# Patient Record
Sex: MALE | Race: WHITE | NOT HISPANIC OR LATINO | Employment: STUDENT | URBAN - METROPOLITAN AREA
[De-identification: names, ages, dates, MRNs, and addresses within clinical notes are randomized per-mention and may not be internally consistent; named-entity substitution may affect disease eponyms.]

---
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
## 2021-01-15 ENCOUNTER — OFFICE VISIT (OUTPATIENT)
Dept: FAMILY MEDICINE | Facility: CLINIC | Age: 21
End: 2021-01-15
Payer: COMMERCIAL

## 2021-01-15 VITALS
HEART RATE: 77 BPM | OXYGEN SATURATION: 97 % | RESPIRATION RATE: 18 BRPM | WEIGHT: 190 LBS | TEMPERATURE: 98.6 F | BODY MASS INDEX: 27.2 KG/M2 | DIASTOLIC BLOOD PRESSURE: 80 MMHG | SYSTOLIC BLOOD PRESSURE: 120 MMHG | HEIGHT: 70 IN

## 2021-01-15 DIAGNOSIS — F33.0 MILD EPISODE OF RECURRENT MAJOR DEPRESSIVE DISORDER (CMS/HCC): ICD-10-CM

## 2021-01-15 PROCEDURE — 99212 OFFICE O/P EST SF 10 MIN: CPT | Performed by: FAMILY MEDICINE

## 2021-01-15 NOTE — ASSESSMENT & PLAN NOTE
Improved.  Discussed patient's improvement that he has had since last visit.  This may be due to decreased stress of school landing.  Patient has since started up semester online again and is feeling that his mood is stable.  Will keep taking medication and discussed that if his symptoms do recur we would increase sertraline to 50 mg daily.  We will follow-up in 6 months but patient told to call office and schedule earlier appointment if symptoms worsening.  Told to obtain blood work at his convenience.

## 2021-01-15 NOTE — PROGRESS NOTES
"      Subjective      Patient ID: Glenroy Antonio is a 20 y.o. male.  2000      Patient reports to office today for follow-up of depression.    Patient was started on sertraline 25 mg daily about 4 weeks ago.  Patient reports his mood has been much better.  Patient started feeling better only few days after taking medication but also reports this is when the semester of school ended as well.  Patient has since started off school again and his mood has been stable.  Denies any side effects to medication.  Complaints.  Patient has not yet obtained blood work written at last visit.      The following have been reviewed and updated as appropriate in this visit:  Allergies  Meds  Problems       Review of Systems   All other systems reviewed and are negative.      Objective     Vitals:    01/15/21 1343   BP: 120/80   BP Location: Right upper arm   Patient Position: Sitting   Pulse: 77   Resp: 18   Temp: 37 °C (98.6 °F)   TempSrc: Oral   SpO2: 97%   Weight: 86.2 kg (190 lb)   Height: 1.778 m (5' 10\")     Body mass index is 27.26 kg/m².    Physical Exam  Vitals signs and nursing note reviewed.   Constitutional:       Appearance: He is well-developed.   Neck:      Musculoskeletal: Normal range of motion.   Cardiovascular:      Rate and Rhythm: Normal rate and regular rhythm.   Pulmonary:      Effort: Pulmonary effort is normal.   Skin:     General: Skin is warm and dry.   Neurological:      Mental Status: He is alert.   Psychiatric:         Mood and Affect: Mood normal.         Behavior: Behavior normal.         Assessment/Plan   Diagnoses and all orders for this visit:    Mild episode of recurrent major depressive disorder (CMS/HCC)  Assessment & Plan:  Improved.  Discussed patient's improvement that he has had since last visit.  This may be due to decreased stress of school landing.  Patient has since started up semester online again and is feeling that his mood is stable.  Will keep taking medication and discussed " that if his symptoms do recur we would increase sertraline to 50 mg daily.  We will follow-up in 6 months but patient told to call office and schedule earlier appointment if symptoms worsening.  Told to obtain blood work at his convenience.      I spent 15 minutes on this date of service performing the following activities: obtaining history, documenting and providing counseling and education.

## 2021-01-29 LAB
25(OH)D3+25(OH)D2 SERPL-MCNC: 21.5 NG/ML (ref 30–100)
ALBUMIN SERPL-MCNC: 4.7 G/DL (ref 3.9–5)
ALBUMIN/GLOB SERPL: 2.1 {RATIO} (ref 1.2–2.2)
ALP SERPL-CCNC: 113 IU/L (ref 39–117)
ALT SERPL-CCNC: 138 IU/L (ref 0–32)
AST SERPL-CCNC: 59 IU/L (ref 0–40)
BASOPHILS # BLD AUTO: 0.1 X10E3/UL (ref 0–0.2)
BASOPHILS NFR BLD AUTO: 1 %
BILIRUB SERPL-MCNC: 0.6 MG/DL (ref 0–1.2)
BUN SERPL-MCNC: 14 MG/DL (ref 6–20)
BUN/CREAT SERPL: 18 (ref 9–23)
CALCIUM SERPL-MCNC: 9.7 MG/DL (ref 8.7–10.2)
CHLORIDE SERPL-SCNC: 103 MMOL/L (ref 96–106)
CO2 SERPL-SCNC: 26 MMOL/L (ref 20–29)
CREAT SERPL-MCNC: 0.77 MG/DL (ref 0.57–1)
EOSINOPHIL # BLD AUTO: 0.2 X10E3/UL (ref 0–0.4)
EOSINOPHIL NFR BLD AUTO: 2 %
ERYTHROCYTE [DISTWIDTH] IN BLOOD BY AUTOMATED COUNT: 12.4 % (ref 11.7–15.4)
GLOBULIN SER CALC-MCNC: 2.2 G/DL (ref 1.5–4.5)
GLUCOSE SERPL-MCNC: 88 MG/DL (ref 65–99)
HCT VFR BLD AUTO: 46.5 % (ref 34–46.6)
HGB BLD-MCNC: 15.8 G/DL (ref 11.1–15.9)
IMM GRANULOCYTES # BLD AUTO: 0 X10E3/UL (ref 0–0.1)
IMM GRANULOCYTES NFR BLD AUTO: 0 %
LAB CORP EGFR IF AFRICN AM: 129 ML/MIN/1.73
LAB CORP EGFR IF NONAFRICN AM: 112 ML/MIN/1.73
LYMPHOCYTES # BLD AUTO: 2.9 X10E3/UL (ref 0.7–3.1)
LYMPHOCYTES NFR BLD AUTO: 40 %
MCH RBC QN AUTO: 29.3 PG (ref 26.6–33)
MCHC RBC AUTO-ENTMCNC: 34 G/DL (ref 31.5–35.7)
MCV RBC AUTO: 86 FL (ref 79–97)
MONOCYTES # BLD AUTO: 0.5 X10E3/UL (ref 0.1–0.9)
MONOCYTES NFR BLD AUTO: 7 %
NEUTROPHILS # BLD AUTO: 3.6 X10E3/UL (ref 1.4–7)
NEUTROPHILS NFR BLD AUTO: 50 %
PLATELET # BLD AUTO: 330 X10E3/UL (ref 150–450)
POTASSIUM SERPL-SCNC: 4.6 MMOL/L (ref 3.5–5.2)
PROT SERPL-MCNC: 6.9 G/DL (ref 6–8.5)
RBC # BLD AUTO: 5.39 X10E6/UL (ref 3.77–5.28)
SODIUM SERPL-SCNC: 143 MMOL/L (ref 134–144)
TSH SERPL DL<=0.005 MIU/L-ACNC: 1.21 UIU/ML (ref 0.45–4.5)
WBC # BLD AUTO: 7.2 X10E3/UL (ref 3.4–10.8)

## 2021-02-04 DIAGNOSIS — R74.01 TRANSAMINITIS: Primary | ICD-10-CM

## 2021-02-05 ENCOUNTER — TELEPHONE (OUTPATIENT)
Dept: FAMILY MEDICINE | Facility: CLINIC | Age: 21
End: 2021-02-05

## 2021-02-05 DIAGNOSIS — Z00.00 ROUTINE HEALTH MAINTENANCE: Primary | ICD-10-CM

## 2021-02-05 NOTE — TELEPHONE ENCOUNTER
Patient's mother would like to discuss her son's recent test results.  She is on his [facility] - [phone]

## 2021-02-05 NOTE — TELEPHONE ENCOUNTER
Is there anything other than the repeat labs that you want me to let her know? Or would you want to s/w mom.

## 2021-02-22 NOTE — PROGRESS NOTES
[facility]  [address] [place] [address]  [phone]       Verification of Patient Location:  The patient affirms they are currently located in the following state: [place]    Request for Consent:   Video Encounter   Hello, my name is Sharifa AYALA Sim.  Before we proceed, can you please verify your identification by telling me your full name and date of birth?  Can you tell me who is in the room with you?    You and I are about to have a telemedicine check-in or visit because you have requested it.  This is a live video-conference.  I am a real person, speaking to you in real time.  There is no one else with me on the video-conference.  However, when we use ([employer], [employer], etc) it is important for you to know that the video-conference may not be secure or private.  I am not recording this conversation and I am asking you not to record it.  This telemedicine visit will be billed to your health insurance or you, if you are self-insured.  You understand you will be responsible for any copayments or coinsurances that apply to your telemedicine visit.  Communication platform used for this encounter:  [employer]     Before starting our telemedicine visit, I am required to get your consent for this virtual check-in or visit by telemedicine. Do you consent?            Patient Response to Request for Consent:  Yes        Reason for visit:   Chief Complaint   Patient presents with   • Depression      HPI   Glenroy Antonio is a 20 y.o. male who presents with worsening depression     Depression  Episode onset: 1.5-2 weeks ago  Associated symptoms include fatigue. Pertinent negatives include no chest pain, chills, coughing, fever, nausea, sore throat or vomiting. Associated symptoms comments: Denies loss of taste or smell   Lack of motivation and focusing on school work and doing daily task .           Medical History:  No past medical history on file.    Surgical History:  No  past surgical history on file.    Social History:  Social History     Social History Narrative   • Not on file       Family History:  Family History   Problem Relation Age of Onset   • Anxiety disorder Biological Mother        Allergies:  Patient has no known allergies.    Current Medications:  Current Outpatient Medications   Medication Sig Dispense Refill   • cholecalciferol, vitamin D3, (VITAMIN D3) 10 mcg (400 unit) capsule Take by mouth.     • sertraline (ZOLOFT) 50 mg tablet Take 1 tablet (50 mg total) by mouth daily. 90 tablet 0     No current facility-administered medications for this visit.        Review of Systems:  Review of Systems   Constitutional: Positive for fatigue. Negative for appetite change, chills and fever.   HENT: Negative.  Negative for sore throat.    Eyes: Negative.    Respiratory: Negative for cough and shortness of breath.    Cardiovascular: Negative for chest pain.   Gastrointestinal: Negative for constipation, diarrhea, nausea and vomiting.   Endocrine: Negative.    Genitourinary: Negative.    Musculoskeletal: Negative.    Skin: Negative.    Allergic/Immunologic: Negative.    Neurological: Negative for dizziness, facial asymmetry and light-headedness.   Hematological: Negative.    Psychiatric/Behavioral: Negative.  Negative for suicidal ideas.       Objective     Vital Signs:  There were no vitals filed for this visit.    BMI:  There is no height or weight on file to calculate BMI.     Physical Exam    Recent labs before today:     Lab Results   Component Value Date    WBC 7.2 01/28/2021    HGB 15.8 01/28/2021    HCT 46.5 01/28/2021     01/28/2021     (H) 01/28/2021    AST 59 (H) 01/28/2021     01/28/2021    K 4.6 01/28/2021     01/28/2021    CREATININE 0.77 01/28/2021    BUN 14 01/28/2021    CO2 26 01/28/2021    TSH 1.210 01/28/2021        Assessment/Plan   Problem List Items Addressed This Visit        Other    Mild episode of recurrent major depressive  disorder (CMS/HCC) - Primary    Relevant Medications    sertraline (ZOLOFT) 50 mg tablet      Other Visit Diagnoses     Elevated liver enzymes        Vegetarian        Relevant Orders    VITAMIN B12/FOLATE, SERUM PANEL        #Depression  Current regimen  Per noted first diagnosed  and start on Zoloft 25 mg daily  In 12/4/2020. Then there was no follow noted until 1/15/2021 which notes he start Zoloft 25 mg daily 4 weeks before that appointment. Per that note patient was stable on 25mg daily and to call if symptoms worsen      Current PHQ-9 score   PHQ 2 to 9:  Will the patient answer the depression questions?: Y    Over the past 2 weeks, how often have you been bothered by feeling little interest or pleasure in doing things?: Almost all    Over the past 2 weeks, how often have you been bothered by feeling down, depressed, or hopeless?: Almost all    Depression Risk: 6    Over the past 2 weeks, how often have you been bothered by trouble falling or staying asleep, or sleeping too much?: Over half (sleeping too much)    Over the past 2 weeks, how often have you been bothered by feeling tired or having little energy?: Over half    Over the past 2 weeks, how often have you been bothered by a poor appetite or overeating?: Several days (over eating)    Over the past 2 weeks, how often have you been bothered by feeling bad about yourself - or that you are a failure or have let yourself or your family down?: Several days    Over the past 2 weeks, how often have you been bothered by trouble concentrating on things, such as reading the newspaper or watching television?: Almost all    Over the past 2 weeks, how often have you been bothered by moving or speaking so slowly that other people could have noticed? Or the opposite - being so fidgety or restless that you have been moving around a lot more than usual?: Not at all    Over the past 2 weeks, how often have you been bothered by thoughts that you would be better off dead  or of hurting yourself in some way?: Not at all    Depression Risk Score: 15    If You Checked Off Any Problems, How Difficult Have These Problems Made It For You to Do Your Work, Take Care of Things at Home, or Get Along with Other People?: very difficult    PHQ interpretation: PHQ result indicates moderately severe depression          Last PHQ-9 score none on record   Therapy: currently not in one   Plan  Increase Zoloft 50mg daily and advise patient to get into therapy. Will send references once patient becomes active on portal. Also going to send message to social work if there are any other resources for patient on therapy when he comes off insurance        #liver enzymes   Lab Results   Component Value Date    AST 59 (H) 01/28/2021     (H) 01/28/2021   patient was noted with doctor Judith to have elevated liver enzymes message were sent for patient to get repeat blood work such as hepatitis. Explained all this to both patient and mom on phone. Mom admits that they will be coming off her insurance due to it ending soon. Patient can be on dad's plan isn't very good. Therefore that patient can get repeat blood work done sooner in the next weeks before coming off moms insurance   Also mom states that son is living with friend who is a vegetarian and that the patient is really not eating a lot of meat but also not eating bean either, there for there is a lack of protein. Explained to mom that we need to workup the hepatitis because it is the gold standard. Also had B12/folate which might be decreased Procedures     Due to the national medical emergency, this visit was conducted via telephone/ video.     Because of the COVID-19 pandemic, in order to limit patient contact and promote the safety of patients and the healthcare team, I did not physically examine the patient.   There are no Patient Instructions on file for this visit.           Time Spent in Medical Discussion During This Encounter:  26  minutes    AYALA West  2/23/2021

## 2021-02-23 ENCOUNTER — TELEMEDICINE (OUTPATIENT)
Dept: FAMILY MEDICINE | Facility: CLINIC | Age: 21
End: 2021-02-23
Payer: COMMERCIAL

## 2021-02-23 DIAGNOSIS — F33.0 MILD EPISODE OF RECURRENT MAJOR DEPRESSIVE DISORDER (CMS/HCC): Primary | ICD-10-CM

## 2021-02-23 DIAGNOSIS — R74.8 ELEVATED LIVER ENZYMES: ICD-10-CM

## 2021-02-23 DIAGNOSIS — Z78.9 VEGETARIAN: ICD-10-CM

## 2021-02-23 PROCEDURE — 99213 OFFICE O/P EST LOW 20 MIN: CPT | Mod: 95 | Performed by: NURSE PRACTITIONER

## 2021-02-23 RX ORDER — SERTRALINE HYDROCHLORIDE 50 MG/1
50 TABLET, FILM COATED ORAL DAILY
Qty: 90 TABLET | Refills: 0 | Status: SHIPPED | OUTPATIENT
Start: 2021-02-23 | End: 2021-03-22 | Stop reason: ALTCHOICE

## 2021-02-23 ASSESSMENT — ENCOUNTER SYMPTOMS
LIGHT-HEADEDNESS: 0
HEMATOLOGIC/LYMPHATIC NEGATIVE: 1
MUSCULOSKELETAL NEGATIVE: 1
DIZZINESS: 0
APPETITE CHANGE: 0
CONSTIPATION: 0
CHILLS: 0
FACIAL ASYMMETRY: 0
EYES NEGATIVE: 1
PSYCHIATRIC NEGATIVE: 1
SHORTNESS OF BREATH: 0
VOMITING: 0
ENDOCRINE NEGATIVE: 1
FATIGUE: 1
NAUSEA: 0
DIARRHEA: 0
SORE THROAT: 0
ALLERGIC/IMMUNOLOGIC NEGATIVE: 1
COUGH: 0
FEVER: 0

## 2021-02-23 ASSESSMENT — PATIENT HEALTH QUESTIONNAIRE - PHQ9
SUM OF ALL RESPONSES TO PHQ9 QUESTIONS 1 & 2: 6
SUM OF ALL RESPONSES TO PHQ QUESTIONS 1-9: 15

## 2021-02-24 ENCOUNTER — PATIENT OUTREACH (OUTPATIENT)
Dept: CASE MANAGEMENT | Facility: CLINIC | Age: 21
End: 2021-02-24

## 2021-02-24 NOTE — PROGRESS NOTES
Social Work Note:     Agency contact:    Social Work referral received by PCP to assist pt. to connect to counseling.    Situation/background:    Pt. is 20 years old and lives with a friend who is a vegetarian. Pt. is a student and is having difficulties focusing and staying motivated with school work and daily tasks. Pt. recently started on Zoloft and the dosage was just increased. Pt. is currently on his mom's insurance, however, he will be come off the policy. His Dad's insurance does not have good coverage. Pt. has a [employer] card scanned in EMR and they offer mental counseling if the policy is active.    Intervention/Follow up;     called pt. to discuss options. Left vm msg requesting a call back at his convenience.   Pt. could possibly have on-line counseling through the [facility] [phone] with a Masters level intern for $15-45 an hour.  Pt. can also go to the [facility] in [place] which has two female therapists. Sliding scale fees based on $125 for 50 min. [phone].   called [facility] in [place] [phone] and they take pts. without insurance. First assessment is $88, follow up counseling appts. range from $33-90 based on length of session from 30-60 min, an initial psych evaluation is $133 and follow up medication management is $45.  Pt. has 's contact number for any further questions or concerns.   to follow up next week.    Elaine Dee, JIM  [phone]

## 2021-03-02 ENCOUNTER — TELEPHONE (OUTPATIENT)
Dept: FAMILY MEDICINE | Facility: CLINIC | Age: 21
End: 2021-03-02

## 2021-03-02 DIAGNOSIS — R74.01 ELEVATED AST (SGOT): Primary | ICD-10-CM

## 2021-03-02 DIAGNOSIS — R74.01 ELEVATED ALT MEASUREMENT: ICD-10-CM

## 2021-03-02 LAB
ALBUMIN SERPL-MCNC: 4.9 G/DL (ref 4.1–5.2)
ALBUMIN/GLOB SERPL: 2.2 {RATIO} (ref 1.2–2.2)
ALP SERPL-CCNC: 110 IU/L (ref 39–117)
ALT SERPL-CCNC: 133 IU/L (ref 0–44)
AST SERPL-CCNC: 46 IU/L (ref 0–40)
BASOPHILS # BLD AUTO: 0.1 X10E3/UL (ref 0–0.2)
BASOPHILS NFR BLD AUTO: 1 %
BILIRUB SERPL-MCNC: 0.6 MG/DL (ref 0–1.2)
BUN SERPL-MCNC: 19 MG/DL (ref 6–20)
BUN/CREAT SERPL: 25 (ref 9–20)
CALCIUM SERPL-MCNC: 9.8 MG/DL (ref 8.7–10.2)
CHLORIDE SERPL-SCNC: 104 MMOL/L (ref 96–106)
CHOLEST SERPL-MCNC: 233 MG/DL (ref 100–169)
CO2 SERPL-SCNC: 24 MMOL/L (ref 20–29)
CREAT SERPL-MCNC: 0.76 MG/DL (ref 0.76–1.27)
EOSINOPHIL # BLD AUTO: 0.1 X10E3/UL (ref 0–0.4)
EOSINOPHIL NFR BLD AUTO: 1 %
ERYTHROCYTE [DISTWIDTH] IN BLOOD BY AUTOMATED COUNT: 12.9 % (ref 11.6–15.4)
FERRITIN SERPL-MCNC: 178 NG/ML (ref 16–124)
FOLATE SERPL-MCNC: 16.3 NG/ML
GLOBULIN SER CALC-MCNC: 2.2 G/DL (ref 1.5–4.5)
GLUCOSE SERPL-MCNC: 86 MG/DL (ref 65–99)
HAV IGM SERPL QL IA: NEGATIVE
HBV CORE IGM SERPL QL IA: NEGATIVE
HBV SURFACE AG SERPL QL IA: NEGATIVE
HCT VFR BLD AUTO: 46.6 % (ref 37.5–51)
HCV AB S/CO SERPL IA: <0.1 S/CO RATIO (ref 0–0.9)
HDLC SERPL-MCNC: 40 MG/DL
HGB BLD-MCNC: 15.7 G/DL (ref 13–17.7)
IMM GRANULOCYTES # BLD AUTO: 0 X10E3/UL (ref 0–0.1)
IMM GRANULOCYTES NFR BLD AUTO: 0 %
IRON SATN MFR SERPL: 26 % (ref 15–55)
IRON SERPL-MCNC: 102 UG/DL (ref 38–169)
LAB CORP EGFR IF AFRICN AM: 153 ML/MIN/1.73
LAB CORP EGFR IF NONAFRICN AM: 132 ML/MIN/1.73
LDLC SERPL CALC-MCNC: 165 MG/DL (ref 0–109)
LYMPHOCYTES # BLD AUTO: 2.9 X10E3/UL (ref 0.7–3.1)
LYMPHOCYTES NFR BLD AUTO: 37 %
MCH RBC QN AUTO: 29.1 PG (ref 26.6–33)
MCHC RBC AUTO-ENTMCNC: 33.7 G/DL (ref 31.5–35.7)
MCV RBC AUTO: 87 FL (ref 79–97)
MONOCYTES # BLD AUTO: 0.5 X10E3/UL (ref 0.1–0.9)
MONOCYTES NFR BLD AUTO: 7 %
NEUTROPHILS # BLD AUTO: 4.2 X10E3/UL (ref 1.4–7)
NEUTROPHILS NFR BLD AUTO: 54 %
PLATELET # BLD AUTO: 300 X10E3/UL (ref 150–450)
POTASSIUM SERPL-SCNC: 4.5 MMOL/L (ref 3.5–5.2)
PROT SERPL-MCNC: 7.1 G/DL (ref 6–8.5)
RBC # BLD AUTO: 5.39 X10E6/UL (ref 4.14–5.8)
SODIUM SERPL-SCNC: 144 MMOL/L (ref 134–144)
TIBC SERPL-MCNC: 399 UG/DL (ref 250–450)
TRIGL SERPL-MCNC: 155 MG/DL (ref 0–89)
UIBC SERPL-MCNC: 297 UG/DL (ref 111–343)
VIT B12 SERPL-MCNC: 635 PG/ML (ref 232–1245)
VLDLC SERPL CALC-MCNC: 28 MG/DL (ref 5–40)
WBC # BLD AUTO: 7.8 X10E3/UL (ref 3.4–10.8)

## 2021-03-02 NOTE — RESULT ENCOUNTER NOTE
Please let patient know that CBC, hepatitis panel and iron serum and saturation all within normal range. As well as Folate and B 12 normal     His serum ferritin is elevated slightly at 178. Which is mostly related to elevated liver enzymes causing inflammation.     His liver enzymes are still elevated but have gone down from last time. AST went from 59 to 46 and ALT went from 138 to 133. I am placing an order for US of liver to get done and can we also get him into [facility] for further workup as well.  I sent referral to [facility] Alexandra HILL.  Also please give Central scheduling [phone]    Total cholesterol  233 we like that under 199, triglycerides are 155 we like below 89.  HDL is 40 we like it over 43. LDL is 165 and we like that under 100.      for cholesterol educate below  Following one if the dietary patterns below of   Mediterranean diet  Diet high in fruits, veggies, whole grains,bean, nutes and seeds  Use of olive oil due to it being a monounsaturated fat and decrease saturated fats such as from butter   Limited alcohol consumption   Low to moderate amounts fish, poultry and dairy products with little red meat  Decrease carb intake  Decrease fat total intake as well as saturated fat  Want to reduce fat intake by 25-30% of daily calories  Ex: 1500 criselda diet would eat 45g or fewer of fat per day  Choose lower content option such as in  skim or low fat milk, low fat cheese, and  fat free yogurt   Increase physical activity     thanks

## 2021-03-02 NOTE — TELEPHONE ENCOUNTER
----- Message from AYALA Colon sent at 3/2/2021 12:25 PM EST -----  Please let patient know that CBC, hepatitis panel and iron serum and saturation all within normal range. As well as Folate and B 12 normal     His serum ferritin is elevated slightly at 178. Which is mostly related to elevated liver enzymes causing inflammation.     His liver enzymes are still elevated but have gone down from last time. AST went from 59 to 46 and ALT went from 138 to 133. I am placing an order for US of liver to get done and can we also get him into [facility] for further workup as well.  I sent referral to [facility] Alexandra HILL.  Also please give Central scheduling [phone]    Total cholesterol  233 we like that under 199, triglycerides are 155 we like below 89.  HDL is 40 we like it over 43. LDL is 165 and we like that under 100.       for cholesterol educate below  Following one if the dietary patterns below of   Mediterranean diet  Diet high in fruits, veggies, whole grains,bean, nutes and seeds  Use of olive oil due to it being a monounsaturated fat and decrease saturated fats such as from butter   Limited alcohol consumption   Low to moderate amounts fish, poultry and dairy products with little red meat  Decrease carb intake  Decrease fat total intake as well as saturated fat  Want to reduce fat intake by 25-30% of daily calories  Ex: 1500 criselda diet would eat 45g or fewer of fat per day  Choose lower content option such as in  skim or low fat milk, low fat cheese, and  fat free yogurt   Increase physical activity     thanks

## 2021-03-03 ENCOUNTER — PATIENT OUTREACH (OUTPATIENT)
Dept: CASE MANAGEMENT | Facility: CLINIC | Age: 21
End: 2021-03-03

## 2021-03-03 NOTE — PROGRESS NOTES
Social Work follow up note:     called pt. to provide counseling options.  Left 2nd vm msg requesting a call back at his convenience.   to follow up in 1-2 weeks.    Elaine Dee, JIM  [phone]

## 2021-03-15 ENCOUNTER — HOSPITAL ENCOUNTER (OUTPATIENT)
Dept: RADIOLOGY | Facility: HOSPITAL | Age: 21
Discharge: HOME | End: 2021-03-15
Attending: NURSE PRACTITIONER
Payer: COMMERCIAL

## 2021-03-15 ENCOUNTER — TELEPHONE (OUTPATIENT)
Dept: FAMILY MEDICINE | Facility: CLINIC | Age: 21
End: 2021-03-15

## 2021-03-15 DIAGNOSIS — R74.01 ELEVATED ALT MEASUREMENT: ICD-10-CM

## 2021-03-15 DIAGNOSIS — R74.01 ELEVATED AST (SGOT): ICD-10-CM

## 2021-03-15 PROCEDURE — 76705 ECHO EXAM OF ABDOMEN: CPT

## 2021-03-15 NOTE — TELEPHONE ENCOUNTER
Left message for patient to call office tomorrow to discuss result of the Liver US. Will try again tomorrow to call patient if I don't hear from him

## 2021-03-16 ENCOUNTER — TELEPHONE (OUTPATIENT)
Dept: FAMILY MEDICINE | Facility: CLINIC | Age: 21
End: 2021-03-16

## 2021-03-16 NOTE — TELEPHONE ENCOUNTER
Left message for patient regarding US of liver result and to call back if questions but gave patient [facility] [place] [facility] [place] [phone] to follow up with.

## 2021-03-18 NOTE — TELEPHONE ENCOUNTER
Patients mother, Akila, called to review patients results.  She is on [facility]. She can be reached at [phone]

## 2021-03-19 ENCOUNTER — PATIENT OUTREACH (OUTPATIENT)
Dept: CASE MANAGEMENT | Facility: CLINIC | Age: 21
End: 2021-03-19

## 2021-03-19 ENCOUNTER — TELEPHONE (OUTPATIENT)
Dept: FAMILY MEDICINE | Facility: CLINIC | Age: 21
End: 2021-03-19

## 2021-03-19 NOTE — TELEPHONE ENCOUNTER
Psychiatrists can charge as much as $400-$500 an hour and many do not accept insurance so [facility] was a reasonable option.

## 2021-03-19 NOTE — PROGRESS NOTES
Social Work follow up note:    AYALA requested that  call pt.'s mom with counseling and psychiatry information since pt. does not answer his phone.   called pt.'s mom, Akila, as requested and left a  msg requesting a call back at her convenience.   to follow up next week.    JIM Buenrostro  [phone]

## 2021-03-19 NOTE — TELEPHONE ENCOUNTER
Liver related mortality from NAFLD is approximately 10% and most patients have increased risk of mortality due to cardiovascular disease. Therefore, managing CV disease risk is a central part of managing NAFLD. We discussed that there are no currently approved drug treatments specifically for fatty liver disease, however there are certain lifestyle modifications that can be made at this time to reduce the primary complications of NAFLD. These complications include cardiovascular events, malignancy, and progressive liver disease.      1. We discussed the importance of weight loss. A 5-10% weight loss would greatly decrease the risks of complications from fatty liver disease.      2. We discussed the importance of diet modification.      3. Moderate exercise 3-4 times a week for at least 30 minutes at a time.     4. I recommended drinking caffeinated black coffee as there is a potential benefit for patients with IGLESIAS  Monitor fatty liver with US annually would probably recommend        spoke with patient PCP regarding this plan and we would like him to see GI.   Some GI resources given.   [facility] [place] [facility] [place]  [phone]  [facility], [place]  [phone]    Patient mothers also mention that patient doesn't feel that the Zoloft increase and wants made switch to celexa. Patient has not look into any of the references social work had mention. Told mom that patient needs to reach out to me or Dr. robbins for follow up on this. Also mention to mom that maybe see a psychiatrists might not be a bad a idea as well. Reach out to micah marin is she can look into some psychiatrist as well and to call mom with them since mom says patient doesn't like to answer his phone     Told mom that patient needs to be taking a more active role in this process as well      Mom verbalize understanding and question answered

## 2021-03-19 NOTE — TELEPHONE ENCOUNTER
Okay wanted to make sure their weren't any other resources besides that thanks   Have a great weekend

## 2021-03-22 ENCOUNTER — PATIENT OUTREACH (OUTPATIENT)
Dept: CASE MANAGEMENT | Facility: CLINIC | Age: 21
End: 2021-03-22

## 2021-03-22 ENCOUNTER — TELEMEDICINE (OUTPATIENT)
Dept: FAMILY MEDICINE | Facility: CLINIC | Age: 21
End: 2021-03-22
Payer: COMMERCIAL

## 2021-03-22 DIAGNOSIS — R79.89 ELEVATED LFTS: ICD-10-CM

## 2021-03-22 DIAGNOSIS — F33.1 MODERATE EPISODE OF RECURRENT MAJOR DEPRESSIVE DISORDER (CMS/HCC): Primary | ICD-10-CM

## 2021-03-22 PROCEDURE — 99214 OFFICE O/P EST MOD 30 MIN: CPT | Mod: 95 | Performed by: FAMILY MEDICINE

## 2021-03-22 RX ORDER — BUPROPION HYDROCHLORIDE 150 MG/1
TABLET, EXTENDED RELEASE ORAL
Qty: 180 TABLET | Refills: 0 | Status: SHIPPED | OUTPATIENT
Start: 2021-03-22 | End: 2021-11-15 | Stop reason: ALTCHOICE

## 2021-03-22 NOTE — TELEPHONE ENCOUNTER
For this patient can you call the mom and let her know that there are no other resources for psychiatrists without insurance and please give theses references and that I also spoke with Dr. Wong and he recommend seeing a psychiatrists as well   Pt. could possibly have on-line counseling through the [facility] [phone] with a Masters level intern for $15-45 an hour.  Pt. can also go to the [facility] in [place] which has two female therapists. Sliding scale fees based on $125 for 50 min. [phone].   called [facility] in [place] [phone] and they take pts. without insurance. First assessment is $88, follow up counseling appts. range from $33-90 based on length of session from 30-60 min, an initial psych evaluation is $133 and follow up medication management is $45.    thanks

## 2021-03-22 NOTE — PROGRESS NOTES
"Verification of Patient Location:  The patient affirms they are currently located in the following state: [place]    Request for Consent:    Video Encounter   Hello, my name is Aston Wong MD.  Before we proceed, can you please verify your identification by telling me your full name and date of birth?  Can you tell me who is in the room with you?    You and I are about to have a telemedicine check-in or visit because you have requested it.  This is a live video-conference.  I am a real person, speaking to you in real time.  There is no one else with me on the video-conference.  However, when we use ([employer], [employer], etc) it is important for you to know that the video-conference may not be secure or private.  I am not recording this conversation and I am asking you not to record it.  This telemedicine visit will be billed to your health insurance or you, if you are self-insured.  You understand you will be responsible for any copayments or coinsurances that apply to your telemedicine visit.  Communication platform used for this encounter:  Doximity     Before starting our telemedicine visit, I am required to get your consent for this virtual check-in or visit by telemedicine. Do you consent?      Patient Response to Request for Consent:  Yes      Visit Documentation:  Subjective     Patient ID: Glenroy Antonio is a 20 y.o. male.  2000      Patient reports for video telemedicine visit follow-up for depression.    Patient has been on sertraline for several months that was increased to 50 mg daily over 4 weeks ago.  Patient denies feeling any improvement on medication.  Patient denies any acute changes in mood but has felt that his depressed mood has slightly worsened.  Patient has had some thoughts of \"not wanting to be alive\" but denies any plan to do so.  No HI.  Patient denies any guns in the home.  Patient has been attempting to get an appointment with psychologist for cognitive behavioral therapy " and has worked with our .    Patient is also on recent work-up for mildly elevated LFTs including ultrasound indicative of fatty infiltration of liver.      The following have been reviewed and updated as appropriate in this visit:  Tobacco  Allergies  Meds  Problems  Med Hx  Surg Hx  Fam Hx       Review of Systems   All other systems reviewed and are negative.        Assessment/Plan   Diagnoses and all orders for this visit:    Moderate episode of recurrent major depressive disorder (CMS/HCC) (Primary)  Assessment & Plan:  Discussed tapering off of Zoloft at this time as it is not giving her much improvement.  Discussed switching to Wellbutrin at this time.  Patient does have family members and her on this medication seem to have benefit from it.  Discussed calling [facility] for further resources to initiate cognitive behavioral therapy.  Patient also given number for mobile crisis if he should have any worsening mood or suicidal ideation.    Discussed tapering Zoloft back down to 25 mg for 2 weeks and then discontinuing and starting Wellbutrin 150 mg daily x1 week and then twice daily.  We will follow-up patient in 4 to 6 weeks or sooner if needed.      Elevated LFTs  Assessment & Plan:  Discussed referral that Sharifa Paredes PA-C had placed for patient.  Discussed that he can follow-up with gastroenterology when he gets his mood symptoms under control.  Told we should be following up on liver function testing in about 3 months if he has not yet seen GI.  Discussed low-fat diet as he symptoms may be due to fatty liver disease.      Other orders  -     buPROPion SR (WELLBUTRIN SR) 150 mg 12 hr tablet; 1 tab PO daily x 1 week, then 1 tab PO twice daily      Time Spent:  I spent 21 minutes on this date of service performing the following activities: obtaining history, documenting, obtaining / reviewing records and providing counseling and education.

## 2021-03-22 NOTE — ASSESSMENT & PLAN NOTE
Discussed referral that Sharifa Paredes PA-C had placed for patient.  Discussed that he can follow-up with gastroenterology when he gets his mood symptoms under control.  Told we should be following up on liver function testing in about 3 months if he has not yet seen GI.  Discussed low-fat diet as he symptoms may be due to fatty liver disease.

## 2021-03-22 NOTE — ASSESSMENT & PLAN NOTE
Discussed tapering off of Zoloft at this time as it is not giving her much improvement.  Discussed switching to Wellbutrin at this time.  Patient does have family members and her on this medication seem to have benefit from it.  Discussed calling [facility] for further resources to initiate cognitive behavioral therapy.  Patient also given number for mobile crisis if he should have any worsening mood or suicidal ideation.    Discussed tapering Zoloft back down to 25 mg for 2 weeks and then discontinuing and starting Wellbutrin 150 mg daily x1 week and then twice daily.  We will follow-up patient in 4 to 6 weeks or sooner if needed.

## 2021-03-22 NOTE — PROGRESS NOTES
Social Work follow up note:    Pt.'s mom, Akila returned call to request referrals for outpatient counsling and psychiatry for her 20 year old son who is struggling with depression. Pt. is doing online school from [employer] and is struggling to concentrate and stay motivated; he has always been a highly motivated student.. He plans to take a semester off. Pt.'s mom stated that her son was up all night crying and will have a Telemed appt. with PCP today to discuss his medications.  Pt. has a 21 year old sister who thrived with on line education and will graduate from [employer].  Pt. currently has [employer] as his insurance and his mom plans to see if it can be extended.   provided referrals for [facility] in [place]: [phone] which accepts insurance and private pay.   The [facility]: [phone] which has a sliding scale fee as well as Masters level interns that charge between $15-$5 per session. They are Telehealth due to covid-19 pandemic.  Pt.'s mom stated that pt  would prefer not to talk to a Taoist counselor at the [facility] in [facility].   to follow up next week.    Elaine Dee, JIM  [phone]

## 2021-03-31 ENCOUNTER — PATIENT OUTREACH (OUTPATIENT)
Dept: CASE MANAGEMENT | Facility: CLINIC | Age: 21
End: 2021-03-31

## 2021-03-31 NOTE — PROGRESS NOTES
Social Work follow up note:     called pt.'s Mom, Akila to see if she had connected with [facility] in [place]. She spoke to them yesterday, however, she could not schedule an appt for him since he is 20 and must schedule for himself.  Pt. told his Mom that he has not seen an improvement on Wellbutrin which may be too soon.   to follow up in 2 weeks.    Elaine Dee, JIM  [phone]

## 2021-04-14 ENCOUNTER — PATIENT OUTREACH (OUTPATIENT)
Dept: CASE MANAGEMENT | Facility: CLINIC | Age: 21
End: 2021-04-14

## 2021-04-14 NOTE — PROGRESS NOTES
Social Work follow up note:     called pt.'s Mom, Akila, to see if pt. had connected with a therapist and psychiatrist at [facility] in [place].  Left vm mg requesting a call back at her convenience.   to follow up next week.    Elaine Dee, JIM  [phone]

## 2021-04-21 ENCOUNTER — PATIENT OUTREACH (OUTPATIENT)
Dept: CASE MANAGEMENT | Facility: CLINIC | Age: 21
End: 2021-04-21

## 2021-04-21 NOTE — PROGRESS NOTES
Social Work follow up note:     called pt.'s Mom, Akila, to see if pt  had connected with therapy and psychiatry at [facility] in [place].   Left 2nd  ms requesting a call back at her convenience.   to follow up in 2 weeks.     Elaine Dee, JIM  [phone]

## 2021-05-05 ENCOUNTER — PATIENT OUTREACH (OUTPATIENT)
Dept: CASE MANAGEMENT | Facility: CLINIC | Age: 21
End: 2021-05-05

## 2021-05-05 NOTE — PROGRESS NOTES
Social Work follow up note:     called pt.'s mom, Akila, to see if her son had connected with counseling and psychiatry. They were not able to connect with [facility] in [facility].  Pt. will have an intake tomorrow with a therapist, Nico Montanez at Dr. Chaudhari's office in [place].  Pt.'s mom reported that her son was doing a bit better since school was over and that was the source of a lot of anxiety.  Pt. has always excelled at school, however, he has been struggling recently.  Pt.'s mom wonders if her son may have ADHD. She stated that the Wellbutrin does not seem to help him significantly.   to follow up in 2-3 weeks.    Elaine Dee, JIM  [phone]

## 2021-05-26 ENCOUNTER — PATIENT OUTREACH (OUTPATIENT)
Dept: CASE MANAGEMENT | Facility: CLINIC | Age: 21
End: 2021-05-26

## 2021-05-26 NOTE — PROGRESS NOTES
Social Work follow up note:     called pt.'s mom, Akila, to see how his intake had gone with Andrew Alba at Dr. Chaudhari's office and if he had connected with psychiatry for medication management.  Left  msg requesting a call back at her convenience.   to follow up in 1-2 weeks.    Elaine Dee, JIM  [phone]

## 2021-06-02 ENCOUNTER — PATIENT OUTREACH (OUTPATIENT)
Dept: CASE MANAGEMENT | Facility: CLINIC | Age: 21
End: 2021-06-02

## 2021-06-02 NOTE — PROGRESS NOTES
Social Work follow up note:     called pt.'s Mom, Akila, to see if he had connected with psychiatry and counseling. She reported that he had cancelled his appt. at the last minute and had not rescheduled.  Pt. told his Mom that Wellbutrin is not helping him at all.  Pt. had planned to move to [place] with his girlfriend and family, however, he is no longer going. Pt. stays in the basement a lot and plays video games. He does not have any suicidal ideation at this time. Pt.'s Dad is not supportive. Pt.'s uncles are not involved. Pt.'s older brother is trying to reach out to his brother and make a connection.  Pt. will move back in with his Mom and her boyfriend in 3 weeks.   to follow up next month.    Elaine Dee, JIM  [phone]

## 2021-07-15 ENCOUNTER — PATIENT OUTREACH (OUTPATIENT)
Dept: CASE MANAGEMENT | Facility: CLINIC | Age: 21
End: 2021-07-15

## 2021-07-15 NOTE — PROGRESS NOTES
Social Work follow up note:     called pt.'s Mom to see how he was doing, if he was back at home and if he had connected with counseling and psychiatry.  Left vm msg requesting a call back at her convenience.   to follow up in 2-3 weeks.    Elaine Dee, JIM  [phone]

## 2021-08-04 ENCOUNTER — PATIENT OUTREACH (OUTPATIENT)
Dept: CASE MANAGEMENT | Facility: CLINIC | Age: 21
End: 2021-08-04

## 2021-08-04 NOTE — PROGRESS NOTES
Social Work follow up note:     called pt.'s Mom, Akila, to see how pt. was doing. He is back at home, not on medication, does not want to see a therapist. He just applied to [employer] yesterday and will take a few classes this fall.  Pt.'s sister is moving to [place] in a few days.  Pt.'s Mom works 2 jobs and is very busy juggling her schedule and all of her responsibilities.   encouraged pt.'s Mom to reach out to PCP as needed.   is available in future if need arises.   to follow as requested.     Elaine Dee, JIM  [phone]

## 2021-11-15 ENCOUNTER — OFFICE VISIT (OUTPATIENT)
Dept: FAMILY MEDICINE | Facility: CLINIC | Age: 21
End: 2021-11-15
Payer: COMMERCIAL

## 2021-11-15 VITALS
HEART RATE: 80 BPM | OXYGEN SATURATION: 99 % | HEIGHT: 70 IN | BODY MASS INDEX: 26.92 KG/M2 | RESPIRATION RATE: 18 BRPM | SYSTOLIC BLOOD PRESSURE: 118 MMHG | DIASTOLIC BLOOD PRESSURE: 80 MMHG | WEIGHT: 188 LBS

## 2021-11-15 DIAGNOSIS — R79.89 ELEVATED LFTS: Primary | ICD-10-CM

## 2021-11-15 DIAGNOSIS — Z00.00 ROUTINE HEALTH MAINTENANCE: ICD-10-CM

## 2021-11-15 DIAGNOSIS — F33.1 MODERATE EPISODE OF RECURRENT MAJOR DEPRESSIVE DISORDER (CMS/HCC): ICD-10-CM

## 2021-11-15 PROCEDURE — 90686 IIV4 VACC NO PRSV 0.5 ML IM: CPT | Performed by: FAMILY MEDICINE

## 2021-11-15 PROCEDURE — 3008F BODY MASS INDEX DOCD: CPT | Performed by: FAMILY MEDICINE

## 2021-11-15 PROCEDURE — 99214 OFFICE O/P EST MOD 30 MIN: CPT | Mod: 25 | Performed by: FAMILY MEDICINE

## 2021-11-15 PROCEDURE — 90471 IMMUNIZATION ADMIN: CPT | Performed by: FAMILY MEDICINE

## 2021-11-15 NOTE — PATIENT INSTRUCTIONS
Patient Education     Liver Function Tests  Why am I having this test?  Liver function tests are done to see how well your liver is working. The proteins and enzymes measured in the test can alert your health care provider to inflammation, damage, or disease in your liver. It is common to have liver function tests:  · When you are taking certain medicines.  · If you have liver disease.  · If you drink a lot of alcohol.  · When you are not feeling well.  · When you have other conditions that may affect your liver.  · During annual physical exams.  · If you have symptoms such as yellowing of the skin (jaundice), abdominal pain, or nausea and vomiting.  What is being tested?  These tests measure various substances in your blood. This may include:  · Alanine transaminase (ALT). This is an enzyme in the liver.  · Aspartate transaminase (AST). This is an enzyme in the liver, heart, and muscles.  · Alkaline phosphatase (ALP). This is a protein in the liver, bile ducts, bone, and other body tissues.  · Total bilirubin. This is a yellow pigment in bile.  · Albumin. This is a protein in the liver.  · Prothrombin time and international normalized ratio (PT and INR). PT measures the time it takes for your blood to clot. INR is a calculation of blood clotting time based on your PT result. It is also calculated based on normal ranges defined by the lab that processed your test.  · Total protein. This includes two proteins, albumin and globulin, found in the blood.  What kind of sample is taken?    A blood sample is required for this test. It is usually collected by inserting a needle into a blood vessel.  How do I prepare for this test?  How you prepare will depend on which tests are being done and the reason for doing them. You may need to:  · Avoid eating for 4-6 hours before the test, or as told by your health care provider.  · Stop taking certain medicines before your blood test, as told by your health care provider.  Tell a  health care provider about:  · All medicines you are taking, including vitamins, herbs, eye drops, creams, and over-the-counter medicines.  · Any medical conditions you have.  · Whether you are pregnant or may be pregnant.  How are the results reported?  Your test results will be reported as values. Your health care provider will compare your results to normal ranges that were established after testing a large group of people (reference ranges). Reference ranges may vary among labs and hospitals. For the substances measured in liver function tests, common reference ranges are:  ALT  · Infant: 10-40 international units/L.  · Child or adult: 4-36 international units/L at 37°C or 4-36 units/L (SI units).  · Reference ranges may be higher for older adults.  AST  · [place] 0-5 days old:  units/L.  · Child younger than 3 years old: 15-60 units/L.  · 3-6 years old: 15-50 units/L.  · 6-12 years old: 10-50 units/L.  · 12-18 years old: 10-40 units/L.  · Adult: 0-35 units/L or 0-0.58 microkatals/L (SI units).  · Reference ranges may be higher for older adults.  ALP  · Child younger than 2 years old:  units/L.  · 2-8 years old:  units/L.  · 9-15 years old:  units/L.  · 16-21 years old:  units/L.  · Adult:  units/L or 0.5-2.0 microkatals/L (SI units).  · Reference ranges may be higher for older adults.  Total bilirubin  · : 1.0-12.0 mg/dL or 17.1-205 micromoles/L (SI units).  · Child or adult: 0.3-1.0 mg/dL or 5.1-17 micromoles/L.  Albumin  · Premature infant: 3.0-4.2 g/dL.  · [place]: 3.5-5.4 g/dL.  · Infant: 4.4-5.4 g/dL.  · Child: 4.0-5.9 g/dL.  · Adult: 3.5-5.0 g/dL or 35-50 g/L (SI units).  PT  · 11.0-12.5 seconds; 85%-100%.  INR  · 0.8-1.1.  Total protein  · Premature infant: 4.2-7.6 g/dL.  · [place]: 4.6-7.4 g/dL.  · Infant: 6.0-6.7 g/dL.  · Child: 6.2-8.0 g/dL.  · Adult: 6.4-8.3 g/dL or 64-83 g/L (SI units).  What do the results mean?  Results that are within the reference  ranges are considered normal. For each substance measured, results outside the reference range can indicate various health issues.  ALT  · Levels above the normal range may indicate liver disease.  AST  · Levels above the normal range may indicate liver disease. Sometimes levels also increase after burns, surgery, heart attack, muscle damage, or seizure.  ALP  · Levels above the normal range may be seen in biliary obstruction, liver diseases, bone disease, thyroid disease, tumors, fractures, leukemia, lymphoma, or several other conditions. People with blood type O or B may show higher levels after a fatty meal.  · Levels below the normal range may indicate bone and teeth conditions, malnutrition, protein deficiency, or Romel's disease.  Total bilirubin  · Levels above the normal range may indicate problems with the liver, gallbladder, or bile ducts.  Albumin  · Levels above the normal range may indicate dehydration. They may also be caused by a diet that is high in protein.  · Levels below the normal range may indicate kidney disease, liver disease, or malabsorption of nutrients.  PT and INR  · Levels above the normal range mean that your blood is clotting slower than normal. This may be due to blood disorders, liver disorders, or low levels of vitamin K.  Total protein  · Levels above the normal range may be due to infection or other diseases.  · Levels below the normal range may be due to an immune system disorder, bleeding, burns, kidney disorder, liver disease, trouble absorbing or getting nutrients, or other conditions that affect the intestines.  Talk with your health care provider about what your results mean.  Questions to ask your health care provider  Ask your health care provider, or the department that is doing the test:  · When will my results be ready?  · How will I get my results?  · What are my treatment options?  · What other tests do I need?  · What are my next steps?  Summary  · Liver function  tests are done to see how well your liver is working.  · These tests measure various proteins and enzymes in your blood. The results can alert your health care provider to inflammation, damage, or disease in your liver.  · Talk with your health care provider about what your results mean.  This information is not intended to replace advice given to you by your health care provider. Make sure you discuss any questions you have with your health care provider.  Document Revised: 08/07/2019 Document Reviewed: 10/02/2018  Elsevier Patient Education © 2021 Elsevier Inc.

## 2021-11-15 NOTE — ASSESSMENT & PLAN NOTE
We will recheck liver function tests in addition to GGT and ceruloplasmin.  Reviewed previous work-ups including ultrasound.  Discussed various ongoing elevation of LFTs, referral to GI.

## 2021-11-15 NOTE — PROGRESS NOTES
"      Subjective      Patient ID: Glenroy Antonio is a 20 y.o. male.  2000      Patient reports to office today for follow-up of elevated LFTs and depression.    Patient had been on Wellbutrin for about 60 weeks and did not feel that it was at all improving his mood.  Patient feels that he has much less stressors in his life right now and his mood is improved on no medications.  Patient does not feel that meeting with psychiatrist or psychologist for cognitive behavioral therapy would be beneficial for him.  Denies any depressed mood currently.    Patient is attending [employer] and feels that this is a good fit for him.    Patient has had elevated LFTs without a clear etiology and ultrasound suggestive of fatty liver.  Denies any alcohol use.  Denies taking Tylenol/acetaminophen on a regular basis.  Feels that at times he does stress eat but does not eat overall unhealthy diet.      The following have been reviewed and updated as appropriate in this visit:  Tobacco  Allergies  Meds  Problems  Med Hx  Surg Hx  Fam Hx       Review of Systems   All other systems reviewed and are negative.      Objective     Vitals:    11/15/21 1559   BP: 118/80   BP Location: Right upper arm   Patient Position: Sitting   Pulse: 80   Resp: 18   SpO2: 99%   Weight: 85.3 kg (188 lb)   Height: 1.778 m (5' 10\")     Body mass index is 26.98 kg/m².    Physical Exam  Vitals and nursing note reviewed.   Constitutional:       Appearance: He is well-developed.   Cardiovascular:      Rate and Rhythm: Normal rate and regular rhythm.   Pulmonary:      Effort: Pulmonary effort is normal.      Breath sounds: Normal breath sounds.   Skin:     General: Skin is warm and dry.   Neurological:      General: No focal deficit present.      Mental Status: He is alert. Mental status is at baseline.         Assessment/Plan   Diagnoses and all orders for this visit:    Elevated LFTs (Primary)  Assessment & " Plan:  We will recheck liver function tests in addition to GGT and ceruloplasmin.  Reviewed previous work-ups including ultrasound.  Discussed various ongoing elevation of LFTs, referral to GI.    Orders:  -     Comprehensive metabolic panel; Future  -     CBC and Differential; Future  -     Gamma GT; Future  -     Ceruloplasmin; Future    Moderate episode of recurrent major depressive disorder (CMS/HCC)  Assessment & Plan:  Improved.  Patient stopped taking Wellbutrin and has felt that stressors in his life have improved.  Patient overall a better place and denies any depression currently.  Told patient to notify office if he is having any issues with his mood going forward.      Routine health maintenance  Assessment & Plan:  Patient agreeable to influenza vaccine today.  Reports he has been vaccinated for COVID-19.      Other orders  -     Influenza vaccine quadrivalent preservative free 6 mon and older IM (FluLaval)

## 2021-11-15 NOTE — ASSESSMENT & PLAN NOTE
Improved.  Patient stopped taking Wellbutrin and has felt that stressors in his life have improved.  Patient overall a better place and denies any depression currently.  Told patient to notify office if he is having any issues with his mood going forward.

## 2021-11-16 ENCOUNTER — TELEPHONE (OUTPATIENT)
Dept: FAMILY MEDICINE | Facility: CLINIC | Age: 21
End: 2021-11-16

## 2021-11-16 DIAGNOSIS — R79.89 ELEVATED LFTS: Primary | ICD-10-CM

## 2021-11-16 NOTE — TELEPHONE ENCOUNTER
Patient mother called requesting a script for a urinalysis due to the patient having dark urine all the time. She states that the patient has fatty liver issues. Please advise

## 2021-11-24 ENCOUNTER — TELEPHONE (OUTPATIENT)
Dept: FAMILY MEDICINE | Facility: CLINIC | Age: 21
End: 2021-11-24

## 2021-11-24 LAB
ALBUMIN SERPL-MCNC: 5 G/DL (ref 4.1–5.2)
ALBUMIN/GLOB SERPL: 2.4 {RATIO} (ref 1.2–2.2)
ALP SERPL-CCNC: 118 IU/L (ref 51–125)
ALT SERPL-CCNC: 33 IU/L (ref 0–44)
APPEARANCE UR: ABNORMAL
AST SERPL-CCNC: 23 IU/L (ref 0–40)
BACTERIA #/AREA URNS HPF: NORMAL /[HPF]
BASOPHILS # BLD AUTO: 0.1 X10E3/UL (ref 0–0.2)
BASOPHILS NFR BLD AUTO: 1 %
BILIRUB SERPL-MCNC: 0.6 MG/DL (ref 0–1.2)
BILIRUB UR QL STRIP: NEGATIVE
BUN SERPL-MCNC: 12 MG/DL (ref 6–20)
BUN/CREAT SERPL: 12 (ref 9–20)
CALCIUM SERPL-MCNC: 9.9 MG/DL (ref 8.7–10.2)
CASTS URNS QL MICRO: NORMAL /LPF
CERULOPLASMIN SERPL-MCNC: 21.8 MG/DL (ref 16–31)
CHLORIDE SERPL-SCNC: 103 MMOL/L (ref 96–106)
CO2 SERPL-SCNC: 24 MMOL/L (ref 20–29)
COLOR UR: YELLOW
CREAT SERPL-MCNC: 1 MG/DL (ref 0.76–1.27)
EOSINOPHIL # BLD AUTO: 0.1 X10E3/UL (ref 0–0.4)
EOSINOPHIL NFR BLD AUTO: 2 %
EPI CELLS #/AREA URNS HPF: NORMAL /HPF (ref 0–10)
ERYTHROCYTE [DISTWIDTH] IN BLOOD BY AUTOMATED COUNT: 12.4 % (ref 11.6–15.4)
GGT SERPL-CCNC: 14 IU/L (ref 0–65)
GLOBULIN SER CALC-MCNC: 2.1 G/DL (ref 1.5–4.5)
GLUCOSE SERPL-MCNC: 94 MG/DL (ref 65–99)
GLUCOSE UR QL: NEGATIVE
HCT VFR BLD AUTO: 47.7 % (ref 37.5–51)
HGB BLD-MCNC: 16.8 G/DL (ref 13–17.7)
HGB UR QL STRIP: NEGATIVE
IMM GRANULOCYTES # BLD AUTO: 0 X10E3/UL (ref 0–0.1)
IMM GRANULOCYTES NFR BLD AUTO: 0 %
KETONES UR QL STRIP: ABNORMAL
LAB CORP EGFR IF AFRICN AM: 126 ML/MIN/1.73
LAB CORP EGFR IF NONAFRICN AM: 109 ML/MIN/1.73
LAB CORP URINALYSIS REFLEX: ABNORMAL
LEUKOCYTE ESTERASE UR QL STRIP: NEGATIVE
LYMPHOCYTES # BLD AUTO: 2.3 X10E3/UL (ref 0.7–3.1)
LYMPHOCYTES NFR BLD AUTO: 39 %
MCH RBC QN AUTO: 30.4 PG (ref 26.6–33)
MCHC RBC AUTO-ENTMCNC: 35.2 G/DL (ref 31.5–35.7)
MCV RBC AUTO: 86 FL (ref 79–97)
MICRO URNS: ABNORMAL
MICRO URNS: ABNORMAL
MONOCYTES # BLD AUTO: 0.3 X10E3/UL (ref 0.1–0.9)
MONOCYTES NFR BLD AUTO: 6 %
NEUTROPHILS # BLD AUTO: 3.2 X10E3/UL (ref 1.4–7)
NEUTROPHILS NFR BLD AUTO: 52 %
NITRITE UR QL STRIP: NEGATIVE
PH UR STRIP: 5.5 [PH] (ref 5–7.5)
PLATELET # BLD AUTO: 304 X10E3/UL (ref 150–450)
POTASSIUM SERPL-SCNC: 4.7 MMOL/L (ref 3.5–5.2)
PROT SERPL-MCNC: 7.1 G/DL (ref 6–8.5)
PROT UR QL STRIP: ABNORMAL
RBC # BLD AUTO: 5.52 X10E6/UL (ref 4.14–5.8)
RBC #/AREA URNS HPF: NORMAL /HPF (ref 0–2)
SODIUM SERPL-SCNC: 142 MMOL/L (ref 134–144)
SP GR UR: 1.02 (ref 1–1.03)
UROBILINOGEN UR STRIP-MCNC: 0.2 MG/DL (ref 0.2–1)
WBC # BLD AUTO: 6 X10E3/UL (ref 3.4–10.8)
WBC #/AREA URNS HPF: NORMAL /HPF (ref 0–5)

## 2021-11-24 NOTE — TELEPHONE ENCOUNTER
"11/24 @ 12:05- Attempted to call mom again and got the same message \"call cannot be completed at this time\".   "

## 2021-12-07 ENCOUNTER — TELEPHONE (OUTPATIENT)
Dept: FAMILY MEDICINE | Facility: CLINIC | Age: 21
End: 2021-12-07
Payer: COMMERCIAL

## 2021-12-07 NOTE — TELEPHONE ENCOUNTER
Lottie, mom is here for an appointment today (Akila). Looks like you have been playing phone tag with her regarding her sons lab results. Do you have a minute to talk to her while she is here today.    She has an appointment with Dr. Braun.    CARMELINA GomezR

## 2021-12-07 NOTE — TELEPHONE ENCOUNTER
Lottie,    Regarding [facility] lab results, please call mauricio Akila @ [phone] to review/discuss.    Thank you.    CARMELINA GomezR

## 2022-01-26 ENCOUNTER — OFFICE VISIT (OUTPATIENT)
Dept: FAMILY MEDICINE | Facility: CLINIC | Age: 22
End: 2022-01-26
Payer: COMMERCIAL

## 2022-01-26 VITALS
OXYGEN SATURATION: 99 % | DIASTOLIC BLOOD PRESSURE: 76 MMHG | TEMPERATURE: 98.2 F | HEIGHT: 70 IN | HEART RATE: 78 BPM | RESPIRATION RATE: 18 BRPM | SYSTOLIC BLOOD PRESSURE: 114 MMHG | BODY MASS INDEX: 26.98 KG/M2

## 2022-01-26 DIAGNOSIS — R79.89 ELEVATED LFTS: ICD-10-CM

## 2022-01-26 DIAGNOSIS — F33.1 MODERATE EPISODE OF RECURRENT MAJOR DEPRESSIVE DISORDER (CMS/HCC): Primary | ICD-10-CM

## 2022-01-26 PROCEDURE — 3008F BODY MASS INDEX DOCD: CPT | Performed by: FAMILY MEDICINE

## 2022-01-26 PROCEDURE — 99214 OFFICE O/P EST MOD 30 MIN: CPT | Performed by: FAMILY MEDICINE

## 2022-01-26 RX ORDER — CITALOPRAM 10 MG/1
10 TABLET ORAL DAILY
Qty: 60 TABLET | Refills: 0 | Status: SHIPPED | OUTPATIENT
Start: 2022-01-26 | End: 2022-03-24 | Stop reason: SDUPTHER

## 2022-01-26 NOTE — ASSESSMENT & PLAN NOTE
Patient has attempted sertraline and Wellbutrin and did not feel benefit in either medication.  Will attempt a second SSRI and start Celexa 10 mg daily.  Patient increase to 20 mg daily in 4 weeks.  We will see back in 6 weeks.  Discussed similar adverse medication profile to sertraline.  Told to call office if experiencing.

## 2022-01-26 NOTE — PROGRESS NOTES
"      Subjective      Patient ID: Glenroy Antonio is a 21 y.o. male.  2000      Patient reports office for depression.    Patient has been diagnosed with major depressive disorder in the past and felt as though he was doing well last summer.  Patient has noted that he has been more depressed lately especially since starting the semester of school.  Patient is currently studying computer science in school and is enjoying studying this but does feel that his mood has been depressed lately.  No SI/HI reported.  Patient denies any specific stressor or recent incident resulting in depression.    Discussed lab work that was performed for elevated LFTs.  Patient with negative work-up and had normalization of his LFTs eventually.  Patient denies alcohol use.      The following have been reviewed and updated as appropriate in this visit:   Tobacco  Allergies  Meds  Problems  Med Hx  Surg Hx  Fam Hx       Review of Systems   All other systems reviewed and are negative.      Objective     Vitals:    01/26/22 1514   BP: 114/76   BP Location: Left upper arm   Patient Position: Sitting   Pulse: 78   Resp: 18   Temp: 36.8 °C (98.2 °F)   TempSrc: Oral   SpO2: 99%   Height: 1.778 m (5' 10\")     Body mass index is 26.98 kg/m².    Physical Exam  Vitals and nursing note reviewed.   Constitutional:       Appearance: He is well-developed.   Cardiovascular:      Rate and Rhythm: Normal rate and regular rhythm.   Pulmonary:      Effort: Pulmonary effort is normal.      Breath sounds: Normal breath sounds.   Skin:     General: Skin is warm and dry.   Neurological:      Mental Status: He is alert.   Psychiatric:         Attention and Perception: Attention normal.         Mood and Affect: Mood is depressed.         Speech: Speech normal.         Behavior: Behavior normal. Behavior is cooperative.         Thought Content: Thought content normal.         Assessment/Plan   Diagnoses and all orders for this visit:    Moderate episode of " recurrent major depressive disorder (CMS/HCC) (Primary)  Assessment & Plan:  Patient has attempted sertraline and Wellbutrin and did not feel benefit in either medication.  Will attempt a second SSRI and start Celexa 10 mg daily.  Patient increase to 20 mg daily in 4 weeks.  We will see back in 6 weeks.  Discussed similar adverse medication profile to sertraline.  Told to call office if experiencing.      Elevated LFTs  Assessment & Plan:  We will recheck LFTs at 6 months.    Orders:  -     Comprehensive metabolic panel; Future    Other orders  -     citalopram (CeleXA) 10 mg tablet; Take 1 tablet (10 mg total) by mouth daily.

## 2022-01-26 NOTE — PATIENT INSTRUCTIONS
Patient Education     Depression, Adult  Major depressive disorder is a mental health condition. This disorder affects feelings. It can also affect the body. Symptoms of this condition last most of the day, almost every day, for 2 weeks. This disorder can affect:  · Relationships.  · Daily activities, such as work and school.  · Activities that you normally like to do.  What are the causes?  The cause of this condition is not known. The disorder is likely caused by a mix of things, including:  · Your personality, such as being a shy person.  · Your behavior, or how you act toward others.  · Your thoughts and feelings.  · Too much alcohol or drugs.  · How you react to stress.  · Health and mental problems that you have had for a long time.  · Things that hurt you in the past (trauma).  · Big changes in your life, such as divorce.  What increases the risk?  The following factors may make you more likely to develop this condition:  · Having family members with depression.  · Being a woman.  · Problems in the family.  · Low levels of some brain chemicals.  · Things that caused you pain as a child, especially if you lost a parent or were abused.  · A lot of stress in your life, such as from:  ? Living without basic needs of life, such as food and shelter.  ? Being treated poorly because of race, sex, or Roman [employer] (discrimination).  · Health and mental problems that you have had for a long time.  What are the signs or symptoms?  The main symptoms of this condition are:  · Being sad all the time.  · Being grouchy all the time.  · Loss of interest in things and activities.  Other symptoms include:  · Sleeping too much or too little.  · Eating too much or too little.  · Gaining or losing weight, without knowing why.  · Feeling tired or having low energy.  · Being restless and weak.  · Feeling hopeless, worthless, or guilty.  · Trouble thinking clearly or making decisions.  · Thoughts of hurting yourself or others, or thoughts  of ending your life.  · Spending a lot of time alone.  · Inability to complete common tasks of daily life.  If you have very bad MDD, you may:  · Believe things that are not true.  · Hear, see, taste, or feel things that are not there.  · Have mild depression that lasts for at least 2 years.  · Feel very sad and hopeless.  · Have trouble speaking or moving.  How is this treated?  This condition may be treated with:  · Talk therapy. This teaches you to know bad thoughts, feelings, and actions and how to change them.  ? This can also help you to communicate with others.  ? This can be done with members of your family.  · Medicines. These can be used to treat worry (anxiety), depression, or low levels of chemicals in the brain.  · Lifestyle changes. You may need to:  ? Limit alcohol use.  ? Limit drug use.  ? Get regular exercise.  ? Get plenty of sleep.  ? Make healthy eating choices.  ? Spend more time outdoors.  · Brain stimulation. This treatment excites the brain. This is done when symptoms are very bad or have not gotten better with other treatments.  Follow these instructions at home:  Activity  · Get regular exercise as told.  · Spend time outdoors as told.  · Make time to do the things you enjoy.  · Find ways to deal with stress. Try to:  ? Meditate.  ? Do deep breathing.  ? Spend time in nature.  ? Keep a journal.  · Return to your normal activities as told by your doctor. Ask your doctor what activities are safe for you.  Alcohol and drug use  · If you drink alcohol:  ? Limit how much you use to:  § 0-1 drink a day for women.  § 0-2 drinks a day for men.  ? Be aware of how much alcohol is in your drink. In the [place] one drink equals one 12 oz bottle of beer (355 mL), one 5 oz glass of wine (148 mL), or one 1½ oz glass of hard liquor (44 mL).  · Talk to your doctor about:  ? Alcohol use. Alcohol can affect some medicines.  ? Any drug use.  General instructions    · Take over-the-counter and prescription  medicines and herbal preparations only as told by your doctor.  · Eat a healthy diet.  · Get a lot of sleep.  · Think about joining a support group. Your doctor may be able to suggest one.  · Keep all follow-up visits as told by your doctor. This is important.  Where to find more information:  · National [place] on Mental Illness: www.merlin.org  · [place] National [place] of Mental Health: www.nimh.nih.gov  · American Psychiatric Association: www.psychiatry.org/patients-families/  Contact a doctor if:  · Your symptoms get worse.  · You get new symptoms.  Get help right away if:  · You hurt yourself.  · You have serious thoughts about hurting yourself or others.  · You see, hear, taste, smell, or feel things that are not there.  If you ever feel like you may hurt yourself or others, or have thoughts about taking your own life, get help right away. Go to your nearest emergency department or:  · Call your local emergency services (911 in the [place]  · Call a suicide crisis helpline, such as the National Suicide Prevention Lifeline at 1-721.678.4196. This is open 24 hours a day in the [place]  · Text the Crisis Text Line at 937752 (in the [place]  Summary  · Major depressive disorder is a mental health condition. This disorder affects feelings. Symptoms of this condition last most of the day, almost every day, for 2 weeks.  · The symptoms of this disorder can cause problems with relationships and with daily activities.  · There are treatments and support for people who get this disorder. You may need more than one type of treatment.  · Get help right away if you have serious thoughts about hurting yourself or others.  This information is not intended to replace advice given to you by your health care provider. Make sure you discuss any questions you have with your health care provider.  Document Revised: 11/28/2020 Document Reviewed: 11/28/2020  Elsevier Patient Education © 2021 [employer] Inc.

## 2022-01-28 NOTE — PROGRESS NOTES
"      Subjective      Patient ID: Glenroy Antonio is a 21 y.o. male.  2000      HPI    The following have been reviewed and updated as appropriate in this visit:   Allergies  Meds  Problems       Review of Systems    Objective     Vitals:    01/26/22 1514   BP: 114/76   BP Location: Left upper arm   Patient Position: Sitting   Pulse: 78   Resp: 18   Temp: 36.8 °C (98.2 °F)   TempSrc: Oral   SpO2: 99%   Height: 1.778 m (5' 10\")     Body mass index is 26.98 kg/m².    Physical Exam    Assessment/Plan   Diagnoses and all orders for this visit:    Moderate episode of recurrent major depressive disorder (CMS/HCC) (Primary)    Elevated LFTs  -     Comprehensive metabolic panel; Future    Other orders  -     citalopram (CeleXA) 10 mg tablet; Take 1 tablet (10 mg total) by mouth daily.        "     Chief complaint  Patient is a 72y old  Male who presents with a chief complaint of leg swelling (28 Jan 2022 10:44)   Review of systems  Patient in bed, looks comfortable, no hypoglycemic episodes.    Labs and Fingersticks  CAPILLARY BLOOD GLUCOSE      POCT Blood Glucose.: 172 mg/dL (28 Jan 2022 12:03)  POCT Blood Glucose.: 118 mg/dL (28 Jan 2022 06:13)  POCT Blood Glucose.: 148 mg/dL (27 Jan 2022 23:01)  POCT Blood Glucose.: 97 mg/dL (27 Jan 2022 18:14)      Medications  MEDICATIONS  (STANDING):  atorvastatin 80 milliGRAM(s) Oral at bedtime  carbidopa/levodopa  25/100 2.5 Tablet(s) Oral <User Schedule>  carbidopa/levodopa  25/100 2 Tablet(s) Oral <User Schedule>  chlorhexidine 4% Liquid 1 Application(s) Topical <User Schedule>  cinacalcet 60 milliGRAM(s) Oral daily  dextrose 40% Gel 15 Gram(s) Oral once  dextrose 5%. 1000 milliLiter(s) (50 mL/Hr) IV Continuous <Continuous>  dextrose 5%. 1000 milliLiter(s) (100 mL/Hr) IV Continuous <Continuous>  dextrose 50% Injectable 25 Gram(s) IV Push once  dextrose 50% Injectable 12.5 Gram(s) IV Push once  dextrose 50% Injectable 25 Gram(s) IV Push once  diVALproex Sprinkle 250 milliGRAM(s) Oral three times a day  DULoxetine 20 milliGRAM(s) Oral daily  folic acid 1 milliGRAM(s) Oral daily  glucagon  Injectable 1 milliGRAM(s) IntraMuscular once  heparin   Injectable 5000 Unit(s) SubCutaneous every 12 hours  insulin glargine Injectable (LANTUS) 6 Unit(s) SubCutaneous at bedtime  insulin lispro (ADMELOG) corrective regimen sliding scale   SubCutaneous every 6 hours  levothyroxine 25 MICROGram(s) Oral daily  memantine 5 milliGRAM(s) Oral at bedtime  metoprolol tartrate 50 milliGRAM(s) Oral two times a day  midodrine 10 milliGRAM(s) Oral <User Schedule>  mirtazapine 7.5 milliGRAM(s) Oral daily  Nephro-celeste 1 Tablet(s) Oral daily  pantoprazole   Suspension 40 milliGRAM(s) Enteral Tube two times a day  QUEtiapine 25 milliGRAM(s) Oral at bedtime  trihexyphenidyl 2 milliGRAM(s) Oral three times a day      Physical Exam  General: Patient comfortable in bed  Vital Signs Last 12 Hrs  T(F): 97.3 (01-28-22 @ 11:11), Max: 97.6 (01-28-22 @ 04:23)  HR: 98 (01-28-22 @ 11:11) (62 - 100)  BP: 150/76 (01-28-22 @ 11:11) (138/81 - 150/76)  BP(mean): --  RR: 18 (01-28-22 @ 11:11) (18 - 18)  SpO2: 91% (01-28-22 @ 11:11) (90% - 92%)         Chief complaint  Patient is a 72y old  Male who presents with a chief complaint of leg swelling (28 Jan 2022 10:44)   Review of systems  Patient in bed, looks comfortable, no hypoglycemic episodes.    Labs and Fingersticks  CAPILLARY BLOOD GLUCOSE      POCT Blood Glucose.: 172 mg/dL (28 Jan 2022 12:03)  POCT Blood Glucose.: 118 mg/dL (28 Jan 2022 06:13)  POCT Blood Glucose.: 148 mg/dL (27 Jan 2022 23:01)  POCT Blood Glucose.: 97 mg/dL (27 Jan 2022 18:14)      Medications  MEDICATIONS  (STANDING):  atorvastatin 80 milliGRAM(s) Oral at bedtime  carbidopa/levodopa  25/100 2.5 Tablet(s) Oral <User Schedule>  carbidopa/levodopa  25/100 2 Tablet(s) Oral <User Schedule>  chlorhexidine 4% Liquid 1 Application(s) Topical <User Schedule>  cinacalcet 60 milliGRAM(s) Oral daily  dextrose 40% Gel 15 Gram(s) Oral once  dextrose 5%. 1000 milliLiter(s) (50 mL/Hr) IV Continuous <Continuous>  dextrose 5%. 1000 milliLiter(s) (100 mL/Hr) IV Continuous <Continuous>  dextrose 50% Injectable 25 Gram(s) IV Push once  dextrose 50% Injectable 12.5 Gram(s) IV Push once  dextrose 50% Injectable 25 Gram(s) IV Push once  diVALproex Sprinkle 250 milliGRAM(s) Oral three times a day  DULoxetine 20 milliGRAM(s) Oral daily  folic acid 1 milliGRAM(s) Oral daily  glucagon  Injectable 1 milliGRAM(s) IntraMuscular once  heparin   Injectable 5000 Unit(s) SubCutaneous every 12 hours  insulin glargine Injectable (LANTUS) 6 Unit(s) SubCutaneous at bedtime  insulin lispro (ADMELOG) corrective regimen sliding scale   SubCutaneous every 6 hours  levothyroxine 25 MICROGram(s) Oral daily  memantine 5 milliGRAM(s) Oral at bedtime  metoprolol tartrate 50 milliGRAM(s) Oral two times a day  midodrine 10 milliGRAM(s) Oral <User Schedule>  mirtazapine 7.5 milliGRAM(s) Oral daily  Nephro-celeste 1 Tablet(s) Oral daily  pantoprazole   Suspension 40 milliGRAM(s) Enteral Tube two times a day  QUEtiapine 25 milliGRAM(s) Oral at bedtime  trihexyphenidyl 2 milliGRAM(s) Oral three times a day      Physical Exam  General: Patient comfortable in bed  Vital Signs Last 12 Hrs  T(F): 97.3 (01-28-22 @ 11:11), Max: 97.6 (01-28-22 @ 04:23)  HR: 98 (01-28-22 @ 11:11) (62 - 100)  BP: 150/76 (01-28-22 @ 11:11) (138/81 - 150/76)  BP(mean): --  RR: 18 (01-28-22 @ 11:11) (18 - 18)  SpO2: 91% (01-28-22 @ 11:11) (90% - 92%)

## 2022-02-23 ENCOUNTER — TELEPHONE (OUTPATIENT)
Dept: FAMILY MEDICINE | Facility: CLINIC | Age: 22
End: 2022-02-23
Payer: COMMERCIAL

## 2022-02-23 NOTE — TELEPHONE ENCOUNTER
Met with pt for 45 min 1:1 session to assess and also complete safety plan. Pt reported that this morning, she woke up feeling happy, which had not happened in weeks, and pt denied any suicidality, is feeling calm, she was able to sleep last night, although she slept with the help of medications. PT reported that she has been eating well, and enthusiastic to be with her wife, reported looking forward to being home, showering at home and eat home cooked meals. Pt and this writer completed the safety plan that can be found in the chart. Pt identified triggers as being overwhelmed without taking breaks, and added that when this is combined with her family being sick, and when she watches world news, she gets triggered. Warning signs are not sleeping well, and losing appetite, and pt discussed the importance of being in touch with her body, and identified multiple coping strategies. Pt identified reasons for living as her wife and family and feels very much supported by her wife, family and friends. Pt feels ready for discharge. She denied any SI/ HI, AVH.      MENTAL STATUS EXAM    APPEARANCE:  [X] adequately groomed []disheveled  [] malodorous [] Other:     BEHAVIOR: [X] cooperative [] uncooperative [X] good EC [] poor EC [X] well related [] oddly related [] guarded []PMA [] PMR []abnormal movements [] Other:     SPEED: [x] normal rate/rhythm/volume [] loud [] quiet [] slow  [] rapid [] pressured [] Other: _________   MOOD: [X] euthymic [] dysphoric []anxious [] irritable [] Other: ___________   AFFECT: [] full [] expansive [] constricted [] blunted [] flat [X] stable [] labile [] Other: _________________ THOUGHT PROCESS: [X] organized [] disorganized [] goal-directed [] concrete [] logical  [] illogical   [] circumstantial [] tangential [] impoverished [] effusive [] repetitive [] Other: ___________   THOUGHT CONTENT: [X] negative for delusions/suicidal ideation /homicidal ideation  [] positive for delusions/suicidal ideation/homicidal ideation Describe:   PERCEPTION: [X] negative for auditory/ visual hallucinations  [] positive for auditory/ visual hallucinations Describe:   INSIGHT/JUDGMENT: [X] good []fair [] poor        IMPULSE CONTROL: [X] good []fair [] poor         COGNITION: [X] alert and oriented to person,time,place    Lacks orientation to person/ time/ place. Describe: _______________________     Please schedule appointment with patient for followup in about 2 weeks   36.9 Met with pt for 45 min 1:1 session. Pt reported that she is feeling better than yesterday, and has started Zoloft, however, still, she does not feel at baseline, and feels that she has way more time to continue her progress. Pt reported that she was unable to sleep last night, and did not want to take Ativan, but being unable to sleep is a warning sign for the patient also discussed that she was unable to sleep leading up to her initial hospitalization. Pt reported that her mood fluctuates less, but she continues to feel easily sad, became tearful when thinking about how she was acting during her psychotic episode in her building, but later on added that most likely others did not care. Pt also reported that her concentration is better, although it is not back to her baseline either. Pt has been experiencing increased appetite. She denied any SI/ HI, AVH. Making improvements.     MENTAL STATUS EXAM    APPEARANCE:  [] adequately groomed []disheveled  [] malodorous [] Other:     BEHAVIOR: [X] cooperative [] uncooperative [X] good EC [] poor EC [] well related [X] oddly related [] guarded []PMA [] PMR []abnormal movements [] Other:     SPEED: [x] normal rate/rhythm/volume [] loud [] quiet [] slow  [] rapid [] pressured [] Other: _________   MOOD: [] euthymic [X] dysphoric []anxious [] irritable [] Other: ___________   AFFECT: [] full [] expansive [X] constricted [] blunted [] flat [] stable [] labile [] Other: _________________ THOUGHT PROCESS: [X] organized [] disorganized [] goal-directed [] concrete [] logical  [] illogical   [] circumstantial [] tangential [] impoverished [] effusive [] repetitive [] Other: ___________   THOUGHT CONTENT: [X] negative for delusions/suicidal ideation /homicidal ideation  [] positive for delusions/suicidal ideation/homicidal ideation Describe:   PERCEPTION: [X] negative for auditory/ visual hallucinations  [] positive for auditory/ visual hallucinations Describe:   INSIGHT/JUDGMENT: [] good [X]fair [] poor        IMPULSE CONTROL: [X] good []fair [] poor         COGNITION: [X] alert and oriented to person,time,place    Lacks orientation to person/ time/ place. Describe: _______________________     Met with pt for 45 min 1:1 session. 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 PT discussed that while she was hospitalized at [facility], she had told all the right things to get discharged quickly, but felt that she was still symptomatic, although her psychosis had improved significantly, and afterwards found herself feeling agitated (wonders if its medication related), and does not feel that her mood is stable. Pt continues to feel that her psychosis has resolved, however her mood is unstable, and throughout the sessions, she was feeling sad and crying frequently. PT reported that she also was feeling suicidal thoughts without plan nor intent, wondering how long her condition will last. Pt reported that she does not feel like this now, and she felt this way, when she first came to the hospital. Pt also reported that she was seeing nightmares about herself having psychotic symptoms, and since she has been feeling calmer, she no longer sees these nightmares. Pt denied any SI/ HI, and motivated for work with the team.  Met with pt for  20 min 1:1 session, pt reported that she took sleeping medication and slept well last night, and was very happy to see her wife last night during visitation. Pt reported that she had an episode of feeling sad yesterday, after all of her peers on the unit were discharged, but she feels better today, and feels that her mood has improved, but still has some room for improvement. Pt denied any suicidal ideation, but reported that she still worries that she might have side effects from the medications after discharge. Pt gave permission for this writer to speak with her wife.   She denied any SI/ HI, AVH. Making improvements.      COLLATERAL conversation with pts wife Joi at [phone]: who reported that she sees and improvement in the patient, pt is no longer endorsing SI and has not been having side effects. Both pt and wife reported that pt would greatly benefit from psychotherapy in an outpt setting. Wife feels pt is safe for discharge, when pt feels ready for it.   MENTAL STATUS EXAM    APPEARANCE:  [] adequately groomed []disheveled  [] malodorous [] Other:     BEHAVIOR: [X] cooperative [] uncooperative [X] good EC [] poor EC [] well related [X] oddly related [] guarded []PMA [] PMR []abnormal movements [] Other:     SPEED: [x] normal rate/rhythm/volume [] loud [] quiet [] slow  [] rapid [] pressured [] Other: _________   MOOD: [] euthymic [X] dysphoric []anxious [] irritable [] Other: ___________   AFFECT: [] full [] expansive [X] constricted [] blunted [] flat [] stable [] labile [] Other: _________________ THOUGHT PROCESS: [X] organized [] disorganized [] goal-directed [] concrete [] logical  [] illogical   [] circumstantial [] tangential [] impoverished [] effusive [] repetitive [] Other: ___________   THOUGHT CONTENT: [X] negative for delusions/suicidal ideation /homicidal ideation  [] positive for delusions/suicidal ideation/homicidal ideation Describe:   PERCEPTION: [X] negative for auditory/ visual hallucinations  [] positive for auditory/ visual hallucinations Describe:   INSIGHT/JUDGMENT: [] good [X]fair [] poor        IMPULSE CONTROL: [X] good []fair [] poor         COGNITION: [X] alert and oriented to person,time,place    Lacks orientation to person/ time/ place. Describe: _______________________

## 2022-03-14 ENCOUNTER — APPOINTMENT (RX ONLY)
Dept: URBAN - METROPOLITAN AREA CLINIC 28 | Facility: CLINIC | Age: 22
Setting detail: DERMATOLOGY
End: 2022-03-14

## 2022-03-14 DIAGNOSIS — D22 MELANOCYTIC NEVI: ICD-10-CM

## 2022-03-14 DIAGNOSIS — Z71.89 OTHER SPECIFIED COUNSELING: ICD-10-CM

## 2022-03-14 DIAGNOSIS — D18.0 HEMANGIOMA: ICD-10-CM

## 2022-03-14 DIAGNOSIS — L21.8 OTHER SEBORRHEIC DERMATITIS: ICD-10-CM | Status: INADEQUATELY CONTROLLED

## 2022-03-14 PROBLEM — D18.01 HEMANGIOMA OF SKIN AND SUBCUTANEOUS TISSUE: Status: ACTIVE | Noted: 2022-03-14

## 2022-03-14 PROBLEM — D22.62 MELANOCYTIC NEVI OF LEFT UPPER LIMB, INCLUDING SHOULDER: Status: ACTIVE | Noted: 2022-03-14

## 2022-03-14 PROCEDURE — ? FULL BODY SKIN EXAM

## 2022-03-14 PROCEDURE — ? PRESCRIPTION

## 2022-03-14 PROCEDURE — ? SUNSCREEN RECOMMENDATIONS

## 2022-03-14 PROCEDURE — 99204 OFFICE O/P NEW MOD 45 MIN: CPT

## 2022-03-14 PROCEDURE — ? COUNSELING

## 2022-03-14 PROCEDURE — ? PRESCRIPTION MEDICATION MANAGEMENT

## 2022-03-14 RX ORDER — KETOCONAZOLE 20 MG/ML
SHAMPOO, SUSPENSION TOPICAL QDAY
Qty: 120 | Refills: 3 | Status: ERX | COMMUNITY
Start: 2022-03-14

## 2022-03-14 RX ADMIN — KETOCONAZOLE: 20 SHAMPOO, SUSPENSION TOPICAL at 00:00

## 2022-03-14 ASSESSMENT — LOCATION ZONE DERM
LOCATION ZONE: FACE
LOCATION ZONE: TRUNK
LOCATION ZONE: ARM

## 2022-03-14 ASSESSMENT — LOCATION SIMPLE DESCRIPTION DERM
LOCATION SIMPLE: LEFT FOREARM
LOCATION SIMPLE: LEFT EYEBROW
LOCATION SIMPLE: ABDOMEN

## 2022-03-14 ASSESSMENT — LOCATION DETAILED DESCRIPTION DERM
LOCATION DETAILED: LEFT CENTRAL EYEBROW
LOCATION DETAILED: LEFT VENTRAL PROXIMAL FOREARM
LOCATION DETAILED: EPIGASTRIC SKIN

## 2022-03-14 NOTE — PROCEDURE: PRESCRIPTION MEDICATION MANAGEMENT
Render In Strict Bullet Format?: No
Initiate Treatment: ketoconazole 2 % shampoo: Massage a thin layer onto face QDAY let sit for 3-5 minutes then rinse
Detail Level: Zone

## 2022-03-24 ENCOUNTER — TELEPHONE (OUTPATIENT)
Dept: FAMILY MEDICINE | Facility: CLINIC | Age: 22
End: 2022-03-24
Payer: COMMERCIAL

## 2022-03-24 RX ORDER — CITALOPRAM 10 MG/1
10 TABLET ORAL DAILY
Qty: 60 TABLET | Refills: 0 | Status: SHIPPED | OUTPATIENT
Start: 2022-03-24 | End: 2022-03-28 | Stop reason: DRUGHIGH

## 2022-03-24 NOTE — TELEPHONE ENCOUNTER
Pt's mom is calling to request refill of:    citalopram (CeleXA) 10 mg tablet    [facility] [place] on [address]

## 2022-03-25 NOTE — TELEPHONE ENCOUNTER
Patient scheduled appt but he is out of medication and would like a short supply until his appointment on Monday. Please advise

## 2022-03-28 ENCOUNTER — TELEMEDICINE (OUTPATIENT)
Dept: FAMILY MEDICINE | Facility: CLINIC | Age: 22
End: 2022-03-28
Payer: COMMERCIAL

## 2022-03-28 DIAGNOSIS — M76.62 ACHILLES TENDINITIS OF LEFT LOWER EXTREMITY: ICD-10-CM

## 2022-03-28 DIAGNOSIS — F33.1 MODERATE EPISODE OF RECURRENT MAJOR DEPRESSIVE DISORDER (CMS/HCC): Primary | ICD-10-CM

## 2022-03-28 PROBLEM — M76.60 ACHILLES TENDINITIS: Status: ACTIVE | Noted: 2022-03-28

## 2022-03-28 PROCEDURE — 99214 OFFICE O/P EST MOD 30 MIN: CPT | Mod: GT | Performed by: FAMILY MEDICINE

## 2022-03-28 RX ORDER — CITALOPRAM 20 MG/1
20 TABLET, FILM COATED ORAL DAILY
Qty: 90 TABLET | Refills: 3 | Status: SHIPPED | OUTPATIENT
Start: 2022-03-28 | End: 2023-08-14 | Stop reason: ALTCHOICE

## 2022-03-28 NOTE — PROGRESS NOTES
Verification of Patient Location:  The patient affirms they are currently located in the following state: [place]    Request for Consent:    Audio and Video Encounter   Hello, my name is Aston Wong MD.  Before we proceed, can you please verify your identification by telling me your full name and date of birth?  Can you tell me who is in the room with you?    You and I are about to have a telemedicine check-in or visit because you have requested it.  This is a live video-conference.  I am a real person, speaking to you in real time.  There is no one else with me on the video-conference.  However, when we use (Foodem, D[employer]Link, etc) it is important for you to know that the video-conference may not be secure or private.  I am not recording this conversation and I am asking you not to record it.  This telemedicine visit will be billed to your health insurance or you, if you are self-insured.  You understand you will be responsible for any copayments or coinsurances that apply to your telemedicine visit.  Communication platform used for this encounter:  Doximity     Before starting our telemedicine visit, I am required to get your consent for this virtual check-in or visit by telemedicine. Do you consent?      Patient Response to Request for Consent:  Yes      Visit Documentation:  Subjective     Patient ID: Glenroy Antonio is a 21 y.o. male.  2000      Patient reports for video telemedicine visit for follow-up of depression and left ankle/foot pain.    Patient placed on citalopram 10 mg daily 2 months ago.  Patient tolerating medication well and even increased to 20 mg daily as discussed few weeks ago.  Patient denies depressed mood at this time and reports no side effects with medication.    Patient has also had an issue with his left ankle/foot for the past few years.  Patient reports that it is in his posterior ankle around his Achilles insertion where he gets mild aching discomfort that is  exacerbated when he is more active.  Patient has been noticing more the past week as he has been getting on the treadmill more frequently.  Patient feels that this for started bothering him in 11th grade when he was increasing his activity levels.  Patient denies any injury or trauma.  Patient followed with pediatrician at the time and was told he has tendinitis.      The following have been reviewed and updated as appropriate in this visit:   Tobacco  Allergies  Meds  Problems  Med Hx  Surg Hx  Fam Hx         Review of Systems   All other systems reviewed and are negative.        Assessment/Plan   Diagnoses and all orders for this visit:    Moderate episode of recurrent major depressive disorder (CMS/HCC) (Primary)  Assessment & Plan:  Stable.  Patient appears to be getting good benefit from citalopram 20 mg daily.  Patient comfortable with continue medication at current dose.  Refill written.  Told to call office if any worsening mood.  Patient has not been following with psychologist and is comfortable going forward with pharmacotherapy only approach.      Achilles tendinitis of left lower extremity  Assessment & Plan:  Unable to examine patient today due to telemedicine visit.  Symptoms sound consistent with Achilles tendinitis.  Told patient that we will give stretches and exercise and patient summary.  If symptoms are persistent, can pursue imaging versus referral to podiatry at that time.      Other orders  -     citalopram (CeleXA) 20 mg tablet; Take 1 tablet (20 mg total) by mouth daily.      Time Spent:  I spent 15 minutes on this date of service performing the following activities: obtaining history, documenting and providing counseling and education.

## 2022-03-28 NOTE — ASSESSMENT & PLAN NOTE
Unable to examine patient today due to telemedicine visit.  Symptoms sound consistent with Achilles tendinitis.  Told patient that we will give stretches and exercise and patient summary.  If symptoms are persistent, can pursue imaging versus referral to podiatry at that time.

## 2022-03-28 NOTE — ASSESSMENT & PLAN NOTE
Stable.  Patient appears to be getting good benefit from citalopram 20 mg daily.  Patient comfortable with continue medication at current dose.  Refill written.  Told to call office if any worsening mood.  Patient has not been following with psychologist and is comfortable going forward with pharmacotherapy only approach.

## 2022-05-09 ENCOUNTER — TELEPHONE (OUTPATIENT)
Dept: FAMILY MEDICINE | Facility: CLINIC | Age: 22
End: 2022-05-09
Payer: COMMERCIAL

## 2022-05-09 NOTE — TELEPHONE ENCOUNTER
Pt saw Dr Wong about 2 mos ago for ankle. It is not getting better. Pt's mom is requesting maybe an XR.    PLs c/b and advise

## 2022-05-09 NOTE — TELEPHONE ENCOUNTER
This was a telemedicine visit.  It sounded as if patient had Achilles tendinitis.  We can order an x-ray but would like him to follow-up with podiatry as he should be seen in person for this issue if it is still bothering him.

## 2022-05-10 NOTE — TELEPHONE ENCOUNTER
@ 10:08-AM- [facility] for pt in regards. C/b to let us know how he would like to proceed.

## 2022-05-11 DIAGNOSIS — M76.62 ACHILLES TENDINITIS OF LEFT LOWER EXTREMITY: Primary | ICD-10-CM

## 2022-05-12 ENCOUNTER — HOSPITAL ENCOUNTER (OUTPATIENT)
Dept: RADIOLOGY | Age: 22
Discharge: HOME | End: 2022-05-12
Attending: FAMILY MEDICINE
Payer: COMMERCIAL

## 2022-05-12 DIAGNOSIS — M76.62 ACHILLES TENDINITIS OF LEFT LOWER EXTREMITY: ICD-10-CM

## 2022-05-12 PROCEDURE — 73610 X-RAY EXAM OF ANKLE: CPT | Mod: LT

## 2022-10-11 RX ORDER — CITALOPRAM 10 MG/1
TABLET ORAL
Qty: 30 TABLET | Refills: 1 | OUTPATIENT
Start: 2022-10-11

## 2023-01-27 ENCOUNTER — TELEPHONE (OUTPATIENT)
Dept: FAMILY MEDICINE | Facility: CLINIC | Age: 23
End: 2023-01-27

## 2023-01-27 NOTE — TELEPHONE ENCOUNTER
Spoke with pt's mother. Pt hasnt been in since January 2022. She is requesting follow up lab orders be put in the system. I informed her he is due for PE and I will send a message to his PCP

## 2023-01-27 NOTE — TELEPHONE ENCOUNTER
Patient mother called to request lab order be sent to [facility] for the patient. Requesting a callback.

## 2023-08-14 ENCOUNTER — OFFICE VISIT (OUTPATIENT)
Dept: FAMILY MEDICINE | Facility: CLINIC | Age: 23
End: 2023-08-14
Payer: COMMERCIAL

## 2023-08-14 VITALS
BODY MASS INDEX: 25.81 KG/M2 | DIASTOLIC BLOOD PRESSURE: 80 MMHG | HEART RATE: 82 BPM | TEMPERATURE: 97.3 F | OXYGEN SATURATION: 97 % | HEIGHT: 71 IN | WEIGHT: 184.4 LBS | SYSTOLIC BLOOD PRESSURE: 118 MMHG | RESPIRATION RATE: 16 BRPM

## 2023-08-14 DIAGNOSIS — F33.1 MODERATE EPISODE OF RECURRENT MAJOR DEPRESSIVE DISORDER (CMS/HCC): ICD-10-CM

## 2023-08-14 DIAGNOSIS — Z00.00 ANNUAL PHYSICAL EXAM: Primary | ICD-10-CM

## 2023-08-14 DIAGNOSIS — R79.89 ELEVATED LFTS: ICD-10-CM

## 2023-08-14 PROBLEM — M76.60 ACHILLES TENDINITIS: Status: RESOLVED | Noted: 2022-03-28 | Resolved: 2023-08-14

## 2023-08-14 PROCEDURE — 99395 PREV VISIT EST AGE 18-39: CPT | Performed by: FAMILY MEDICINE

## 2023-08-14 PROCEDURE — 3008F BODY MASS INDEX DOCD: CPT | Performed by: FAMILY MEDICINE

## 2023-08-14 RX ORDER — TRIAMCINOLONE ACETONIDE 1 MG/G
CREAM TOPICAL
COMMUNITY
Start: 2023-08-08 | End: 2024-04-10

## 2023-08-14 ASSESSMENT — VISUAL ACUITY
OD_CC: 20/40
OS_CC: 20/30

## 2023-08-14 NOTE — ASSESSMENT & PLAN NOTE
Patient had work-up for elevated LFTs which did eventually normalize.  Patient denies alcohol use.  We will again check LFTs with routine lab work.

## 2023-08-14 NOTE — ASSESSMENT & PLAN NOTE
Patient appears to be healthy 22-year-old male.  Encouraged healthy eating habits and regular exercise.    CBC and CMP ordered.    Patient denies drug/alcohol use.    Recommend routine dental and eye examinations.     form filled out today.  Vision screen passed.    Recommend annual follow-up for PE.

## 2023-08-14 NOTE — PROGRESS NOTES
"      Subjective      Patient ID: Glenroy Antonio is a 22 y.o. male.  2000      Patient reports office for annual physical exam.    Patient took a hiatus from school due to financial reasons and is currently working as an  at the [employer].  Patient feels that he generally eats healthy and recently has started exercising more with free weights.  Denies any issues with sleep.  Denies any issues with urination or bowels.  Patient is in a monogamous relationship.  Denies any smoking or alcohol use.  Patient stopped his Celexa almost a year ago and feels that his mood has been stable.      The following have been reviewed and updated as appropriate in this visit:   Tobacco  Allergies  Meds  Problems  Med Hx  Surg Hx  Fam Hx       Review of Systems   All other systems reviewed and are negative.      Objective     Vitals:    08/14/23 1305   BP: 118/80   BP Location: Right upper arm   Patient Position: Sitting   Pulse: 82   Resp: 16   Temp: 36.3 °C (97.3 °F)   TempSrc: Temporal   SpO2: 97%   Weight: 83.6 kg (184 lb 6.4 oz)   Height: 1.803 m (5' 11\")     Body mass index is 25.72 kg/m².    Physical Exam  Vitals and nursing note reviewed.   Constitutional:       Appearance: Normal appearance. He is well-developed.   HENT:      Right Ear: Tympanic membrane and ear canal normal.      Left Ear: Tympanic membrane and ear canal normal.      Nose: Nose normal.      Mouth/Throat:      Mouth: Mucous membranes are moist.      Pharynx: Oropharynx is clear.   Eyes:      Conjunctiva/sclera: Conjunctivae normal.   Cardiovascular:      Rate and Rhythm: Normal rate and regular rhythm.   Pulmonary:      Effort: Pulmonary effort is normal.      Breath sounds: Normal breath sounds.   Abdominal:      Palpations: Abdomen is soft. There is no mass.      Tenderness: There is no abdominal tenderness.      Hernia: No hernia is present.   Genitourinary:     Testes: Normal.   Musculoskeletal:         General: Normal range " of motion.      Cervical back: Normal range of motion and neck supple.   Skin:     General: Skin is warm and dry.   Neurological:      General: No focal deficit present.      Mental Status: He is alert. Mental status is at baseline.   Psychiatric:         Mood and Affect: Mood normal.         Assessment/Plan   Diagnoses and all orders for this visit:    Annual physical exam (Primary)  Assessment & Plan:  Patient appears to be healthy 22-year-old male.  Encouraged healthy eating habits and regular exercise.    CBC and CMP ordered.    Patient denies drug/alcohol use.    Recommend routine dental and eye examinations.     form filled out today.  Vision screen passed.    Recommend annual follow-up for PE.      Moderate episode of recurrent major depressive disorder (CMS/HCC)  Assessment & Plan:  Stable.  Patient currently off of any psychiatric medications and feels that he is doing well.  Told to call office if he is having any change in mood.      Elevated LFTs  Assessment & Plan:  Patient had work-up for elevated LFTs which did eventually normalize.  Patient denies alcohol use.  We will again check LFTs with routine lab work.    Orders:  -     Comprehensive metabolic panel; Future  -     CBC and Differential; Future

## 2023-08-14 NOTE — ASSESSMENT & PLAN NOTE
Stable.  Patient currently off of any psychiatric medications and feels that he is doing well.  Told to call office if he is having any change in mood.

## 2023-08-22 ENCOUNTER — APPOINTMENT (RX ONLY)
Dept: URBAN - METROPOLITAN AREA CLINIC 28 | Facility: CLINIC | Age: 23
Setting detail: DERMATOLOGY
End: 2023-08-22

## 2023-08-22 DIAGNOSIS — T07XXXA INSECT BITE, NONVENOMOUS, OF OTHER, MULTIPLE, AND UNSPECIFIED SITES, WITHOUT MENTION OF INFECTION: ICD-10-CM | Status: RESOLVING

## 2023-08-22 PROBLEM — S50.862A INSECT BITE (NONVENOMOUS) OF LEFT FOREARM, INITIAL ENCOUNTER: Status: ACTIVE | Noted: 2023-08-22

## 2023-08-22 PROBLEM — S20.469A INSECT BITE (NONVENOMOUS) OF UNSPECIFIED BACK WALL OF THORAX, INITIAL ENCOUNTER: Status: ACTIVE | Noted: 2023-08-22

## 2023-08-22 PROBLEM — S50.861A INSECT BITE (NONVENOMOUS) OF RIGHT FOREARM, INITIAL ENCOUNTER: Status: ACTIVE | Noted: 2023-08-22

## 2023-08-22 PROBLEM — S30.861A INSECT BITE (NONVENOMOUS) OF ABDOMINAL WALL, INITIAL ENCOUNTER: Status: ACTIVE | Noted: 2023-08-22

## 2023-08-22 PROCEDURE — ? PRESCRIPTION MEDICATION MANAGEMENT

## 2023-08-22 PROCEDURE — 99213 OFFICE O/P EST LOW 20 MIN: CPT

## 2023-08-22 PROCEDURE — ? PHOTO-DOCUMENTATION

## 2023-08-22 PROCEDURE — ? COUNSELING

## 2023-08-22 PROCEDURE — ? DEFER

## 2023-08-22 PROCEDURE — ? PRESCRIPTION

## 2023-08-22 RX ORDER — TRIAMCINOLONE ACETONIDE 1 MG/G
CREAM TOPICAL BID
Qty: 80 | Refills: 2 | Status: ERX | COMMUNITY
Start: 2023-08-22

## 2023-08-22 RX ADMIN — TRIAMCINOLONE ACETONIDE: 1 CREAM TOPICAL at 00:00

## 2023-08-22 ASSESSMENT — LOCATION SIMPLE DESCRIPTION DERM
LOCATION SIMPLE: ABDOMEN
LOCATION SIMPLE: RIGHT UPPER BACK
LOCATION SIMPLE: RIGHT FOREARM
LOCATION SIMPLE: LEFT FOREARM

## 2023-08-22 ASSESSMENT — LOCATION DETAILED DESCRIPTION DERM
LOCATION DETAILED: RIGHT DISTAL DORSAL FOREARM
LOCATION DETAILED: PERIUMBILICAL SKIN
LOCATION DETAILED: LEFT PROXIMAL DORSAL FOREARM
LOCATION DETAILED: RIGHT INFERIOR UPPER BACK

## 2023-08-22 ASSESSMENT — LOCATION ZONE DERM
LOCATION ZONE: ARM
LOCATION ZONE: TRUNK

## 2023-08-22 NOTE — PROCEDURE: DEFER
Size Of Lesion In Cm (Optional): 0
Procedure To Be Performed At Next Visit: Biopsy by punch method
Instructions (Optional): pt will come in for bx if skin flares again
Introduction Text (Please End With A Colon): The following procedure was deferred:
Detail Level: Zone

## 2023-08-22 NOTE — PROCEDURE: PRESCRIPTION MEDICATION MANAGEMENT
Detail Level: Zone
Continue Regimen: triamcinolone acetonide 0.1% topical cream: Apply BID to AA of body PRN flare (do not apply to face)
Render In Strict Bullet Format?: No

## 2023-08-22 NOTE — HPI: BUMPS
How Severe Are Your Bumps?: mild
Is This A New Presentation, Or A Follow-Up?: Bumps
Additional History: Pt says he has been getting these bumps for about 3 weeks. They are itchy and warm to the touch, he went to urgent care and they told him they were bug bites and gave him a steroid cream. His skin is clear today.

## 2023-08-30 LAB
ALBUMIN SERPL-MCNC: 5.1 G/DL (ref 4.3–5.2)
ALBUMIN/GLOB SERPL: 2.7 {RATIO} (ref 1.2–2.2)
ALP SERPL-CCNC: 93 IU/L (ref 44–121)
ALT SERPL-CCNC: 18 IU/L (ref 0–44)
AST SERPL-CCNC: 20 IU/L (ref 0–40)
BASOPHILS # BLD AUTO: 0.1 X10E3/UL (ref 0–0.2)
BASOPHILS NFR BLD AUTO: 1 %
BILIRUB SERPL-MCNC: 0.4 MG/DL (ref 0–1.2)
BUN SERPL-MCNC: 15 MG/DL (ref 6–20)
BUN/CREAT SERPL: 17 (ref 9–20)
CALCIUM SERPL-MCNC: 9.5 MG/DL (ref 8.7–10.2)
CHLORIDE SERPL-SCNC: 103 MMOL/L (ref 96–106)
CO2 SERPL-SCNC: 22 MMOL/L (ref 20–29)
CREAT SERPL-MCNC: 0.89 MG/DL (ref 0.76–1.27)
EGFRCR SERPLBLD CKD-EPI 2021: 125 ML/MIN/1.73
EOSINOPHIL # BLD AUTO: 0.1 X10E3/UL (ref 0–0.4)
EOSINOPHIL NFR BLD AUTO: 1 %
ERYTHROCYTE [DISTWIDTH] IN BLOOD BY AUTOMATED COUNT: 12.5 % (ref 11.6–15.4)
GLOBULIN SER CALC-MCNC: 1.9 G/DL (ref 1.5–4.5)
GLUCOSE SERPL-MCNC: 89 MG/DL (ref 70–99)
HCT VFR BLD AUTO: 45.3 % (ref 37.5–51)
HGB BLD-MCNC: 15.2 G/DL (ref 13–17.7)
IMM GRANULOCYTES # BLD AUTO: 0 X10E3/UL (ref 0–0.1)
IMM GRANULOCYTES NFR BLD AUTO: 0 %
LYMPHOCYTES # BLD AUTO: 2.2 X10E3/UL (ref 0.7–3.1)
LYMPHOCYTES NFR BLD AUTO: 39 %
MCH RBC QN AUTO: 29.1 PG (ref 26.6–33)
MCHC RBC AUTO-ENTMCNC: 33.6 G/DL (ref 31.5–35.7)
MCV RBC AUTO: 87 FL (ref 79–97)
MONOCYTES # BLD AUTO: 0.3 X10E3/UL (ref 0.1–0.9)
MONOCYTES NFR BLD AUTO: 5 %
NEUTROPHILS # BLD AUTO: 3 X10E3/UL (ref 1.4–7)
NEUTROPHILS NFR BLD AUTO: 54 %
PLATELET # BLD AUTO: 265 X10E3/UL (ref 150–450)
POTASSIUM SERPL-SCNC: 4.5 MMOL/L (ref 3.5–5.2)
PROT SERPL-MCNC: 7 G/DL (ref 6–8.5)
RBC # BLD AUTO: 5.22 X10E6/UL (ref 4.14–5.8)
SODIUM SERPL-SCNC: 141 MMOL/L (ref 134–144)
WBC # BLD AUTO: 5.6 X10E3/UL (ref 3.4–10.8)

## 2023-09-08 ENCOUNTER — APPOINTMENT (RX ONLY)
Dept: URBAN - METROPOLITAN AREA CLINIC 28 | Facility: CLINIC | Age: 23
Setting detail: DERMATOLOGY
End: 2023-09-08

## 2023-09-08 DIAGNOSIS — T07XXXA INSECT BITE, NONVENOMOUS, OF OTHER, MULTIPLE, AND UNSPECIFIED SITES, WITHOUT MENTION OF INFECTION: ICD-10-CM | Status: UNCHANGED

## 2023-09-08 PROBLEM — L30.9 DERMATITIS, UNSPECIFIED: Status: ACTIVE | Noted: 2023-09-08

## 2023-09-08 PROCEDURE — ? PHOTO-DOCUMENTATION

## 2023-09-08 PROCEDURE — ? BIOPSY BY PUNCH METHOD

## 2023-09-08 PROCEDURE — 11105 PUNCH BX SKIN EA SEP/ADDL: CPT

## 2023-09-08 PROCEDURE — ? PRESCRIPTION MEDICATION MANAGEMENT

## 2023-09-08 PROCEDURE — 11104 PUNCH BX SKIN SINGLE LESION: CPT

## 2023-09-08 ASSESSMENT — LOCATION SIMPLE DESCRIPTION DERM
LOCATION SIMPLE: RIGHT FOREARM
LOCATION SIMPLE: LEFT UPPER BACK
LOCATION SIMPLE: LEFT FOREARM
LOCATION SIMPLE: ABDOMEN
LOCATION SIMPLE: RIGHT UPPER BACK

## 2023-09-08 ASSESSMENT — LOCATION DETAILED DESCRIPTION DERM
LOCATION DETAILED: LEFT INFERIOR UPPER BACK
LOCATION DETAILED: RIGHT MID-UPPER BACK
LOCATION DETAILED: LEFT PROXIMAL DORSAL FOREARM
LOCATION DETAILED: RIGHT INFERIOR UPPER BACK
LOCATION DETAILED: PERIUMBILICAL SKIN
LOCATION DETAILED: RIGHT DISTAL DORSAL FOREARM

## 2023-09-08 ASSESSMENT — LOCATION ZONE DERM
LOCATION ZONE: TRUNK
LOCATION ZONE: ARM

## 2023-09-08 NOTE — PROCEDURE: BIOPSY BY PUNCH METHOD
Detail Level: Detailed
Was A Bandage Applied: Yes
Punch Size In Mm: 3
Size Of Lesion In Cm (Optional): 0
Depth Of Punch Biopsy: dermis
Biopsy Type: H and E
Anesthesia Type: 1% lidocaine without epinephrine
Anesthesia Volume In Cc (Will Not Render If 0): 0.5
Hemostasis: Pro QR topical powder
Epidermal Sutures: none, closed by secondary intention
Wound Care: Petrolatum
Dressing: pressure dressing
Patient Will Remove Sutures At Home?: No
Lab: 6
Consent: Written consent was obtained and risks were reviewed including but not limited to scarring, infection, bleeding, scabbing, incomplete removal, nerve damage and allergy to anesthesia.
Post-Care Instructions: I reviewed with the patient in detail post-care instructions. Patient is to keep the biopsy site dry overnight, and then apply bacitracin twice daily until healed. Patient may apply hydrogen peroxide soaks to remove any crusting.
Home Suture Removal Text: Patient was provided a home suture removal kit and will remove their sutures at home.  If they have any questions or difficulties they will call the office.
Notification Instructions: Patient will be notified of biopsy results. However, patient instructed to call the office if not contacted within 2 weeks.
Billing Type: Third-Party Bill
Information: Selecting Yes will display possible errors in your note based on the variables you have selected. This validation is only offered as a suggestion for you. PLEASE NOTE THAT THE VALIDATION TEXT WILL BE REMOVED WHEN YOU FINALIZE YOUR NOTE. IF YOU WANT TO FAX A PRELIMINARY NOTE YOU WILL NEED TO TOGGLE THIS TO 'NO' IF YOU DO NOT WANT IT IN YOUR FAXED NOTE.

## 2023-10-10 NOTE — PROCEDURE: PHOTO-DOCUMENTATION
Photo Preface (Leave Blank If You Do Not Want): Photographs were obtained today
Detail Level: Zone
5-Fu Counseling: 5-Fluorouracil Counseling:  I discussed with the patient the risks of 5-fluorouracil including but not limited to erythema, scaling, itching, weeping, crusting, and pain.

## 2023-11-13 ENCOUNTER — TELEPHONE (OUTPATIENT)
Dept: FAMILY MEDICINE | Facility: CLINIC | Age: 23
End: 2023-11-13

## 2023-11-13 NOTE — TELEPHONE ENCOUNTER
S/w patient's mom. Patient experiencing dizziness and fatigue for about 2 weeks on and off. Scheduled for 11/14 at 11 with Dr. Wong.

## 2023-11-14 ENCOUNTER — OFFICE VISIT (OUTPATIENT)
Dept: FAMILY MEDICINE | Facility: CLINIC | Age: 23
End: 2023-11-14
Payer: COMMERCIAL

## 2023-11-14 VITALS
BODY MASS INDEX: 24.22 KG/M2 | HEIGHT: 71 IN | OXYGEN SATURATION: 98 % | HEART RATE: 74 BPM | SYSTOLIC BLOOD PRESSURE: 120 MMHG | DIASTOLIC BLOOD PRESSURE: 80 MMHG | RESPIRATION RATE: 18 BRPM | TEMPERATURE: 98 F | WEIGHT: 173 LBS

## 2023-11-14 DIAGNOSIS — R53.83 OTHER FATIGUE: ICD-10-CM

## 2023-11-14 DIAGNOSIS — R00.2 HEART PALPITATIONS: Primary | ICD-10-CM

## 2023-11-14 PROBLEM — R79.89 ELEVATED LFTS: Status: RESOLVED | Noted: 2021-03-22 | Resolved: 2023-11-14

## 2023-11-14 PROCEDURE — 93000 ELECTROCARDIOGRAM COMPLETE: CPT | Performed by: FAMILY MEDICINE

## 2023-11-14 PROCEDURE — 3008F BODY MASS INDEX DOCD: CPT | Performed by: FAMILY MEDICINE

## 2023-11-14 PROCEDURE — 99214 OFFICE O/P EST MOD 30 MIN: CPT | Performed by: FAMILY MEDICINE

## 2023-11-14 NOTE — LETTER
November 14, 2023     Patient: Glenroy Antonio  YOB: 2000  Date of Visit: 11/14/2023    To Whom it May Concern:    Glenroy Antonio was seen in my clinic on 11/14/2023 at 11:00 am. Patient may return to work on 11/17/2023    If you have any questions or concerns, please don't hesitate to call.         Sincerely,         Aston Wong MD        CC: No Recipients

## 2023-11-14 NOTE — ASSESSMENT & PLAN NOTE
EKG within normal limits.  Patient not having any significant elevations in his heart rate just more noticeable heartbeats as patient describes.  Patient does not even have true near syncope as he describes his dizziness as difficulties focusing and does not really admit to any vertigo or actual lightheadedness.

## 2023-11-14 NOTE — ASSESSMENT & PLAN NOTE
Discussed multiple etiologies that could cause fatigue.  Feel that improving patient's sleep hygiene would be beneficial whether it is causing his fatigue or not.    We will check lab work including TSH and Lyme disease.  Patient to continue to monitor symptoms as this may also be due to a viral syndrome which is causing his fatigue and may improve over the next couple of weeks as well.    Patient to update us next week if symptoms not improving.  We will get back to patient when his labs are resulted.

## 2023-11-14 NOTE — PROGRESS NOTES
"      Subjective      Patient ID: Glenroy Antonio is a 22 y.o. male.  2000      Patient reports office for fatigue.    Patient reports his symptoms started over 2 weeks ago.  Patient feels that he has sleep poor hygiene but has been trying to go to bed early around midnight now.  Patient has also experienced dizziness over the past couple of weeks at times.  Patient denies any room spinning sensation or vertigo.  Patient also denies lightheadedness but describes his dizziness as trouble focusing his vision.  Denies any headaches.  Mild body aches.  No fevers, chills, or cough.  Patient has noticed palpitations at times but denies that this is related to exertion.  Patient denies that his heart is beating fast but that he can notice his heartbeat more than usual.  Patient has noticed that his fatigue also comes with some mild dyspnea on exertion where he feels that he gets worn out and short of breath with doing physical activity like stocking shelves that typically would not bother him.  Denies any sick contacts.  Denies any recent bug or tick bites.    Patient had bedbugs about 3 months ago that has been eradicated.      The following have been reviewed and updated as appropriate in this visit:   Tobacco  Allergies  Meds  Problems  Med Hx  Surg Hx  Fam Hx       Review of Systems   All other systems reviewed and are negative.      Objective     Vitals:    11/14/23 1108   BP: 120/80   BP Location: Left upper arm   Patient Position: Sitting   Pulse: 74   Resp: 18   Temp: 36.7 °C (98 °F)   SpO2: 98%   Weight: 78.5 kg (173 lb)   Height: 1.803 m (5' 11\")     Body mass index is 24.13 kg/m².    Physical Exam  Vitals and nursing note reviewed.   Constitutional:       Appearance: Normal appearance. He is well-developed.   HENT:      Right Ear: Tympanic membrane and ear canal normal.      Left Ear: Tympanic membrane and ear canal normal.   Eyes:      Conjunctiva/sclera: Conjunctivae normal.   Cardiovascular:      " Rate and Rhythm: Normal rate and regular rhythm.   Pulmonary:      Effort: Pulmonary effort is normal.      Breath sounds: Normal breath sounds.   Musculoskeletal:      Cervical back: Normal range of motion and neck supple.   Lymphadenopathy:      Cervical: No cervical adenopathy.   Skin:     General: Skin is warm and dry.   Neurological:      General: No focal deficit present.      Mental Status: He is alert. Mental status is at baseline.         Assessment/Plan   Diagnoses and all orders for this visit:    Heart palpitations (Primary)  Assessment & Plan:  EKG within normal limits.  Patient not having any significant elevations in his heart rate just more noticeable heartbeats as patient describes.  Patient does not even have true near syncope as he describes his dizziness as difficulties focusing and does not really admit to any vertigo or actual lightheadedness.    Orders:  -     ECG 12 LEAD OFFICE PERFORMED    Other fatigue  Assessment & Plan:  Discussed multiple etiologies that could cause fatigue.  Feel that improving patient's sleep hygiene would be beneficial whether it is causing his fatigue or not.    We will check lab work including TSH and Lyme disease.  Patient to continue to monitor symptoms as this may also be due to a viral syndrome which is causing his fatigue and may improve over the next couple of weeks as well.    Patient to update us next week if symptoms not improving.  We will get back to patient when his labs are resulted.    Orders:  -     Comprehensive metabolic panel; Future  -     CBC and Differential; Future  -     TSH w reflex FT4; Future  -     Lyme Disease Antibodies, Total and IGM w Reflex to Line Blot; Future

## 2023-11-14 NOTE — PROCEDURES
ECG 12 LEAD OFFICE PERFORMED    Date/Time: 11/14/2023 12:55 PM    Performed by: Aston Wong MD  Authorized by: Aston Wong MD    Interpretation:     Interpretation: normal    Rate:     ECG rate:  75    ECG rate assessment: normal    Rhythm:     Rhythm: sinus rhythm    Ectopy:     Ectopy: none    ST segments:     ST segments:  Normal  T waves:     T waves: normal

## 2023-11-15 LAB
ALBUMIN SERPL-MCNC: 5 G/DL (ref 4.3–5.2)
ALBUMIN/GLOB SERPL: 2.2 {RATIO} (ref 1.2–2.2)
ALP SERPL-CCNC: 92 IU/L (ref 44–121)
ALT SERPL-CCNC: 16 IU/L (ref 0–44)
AST SERPL-CCNC: 16 IU/L (ref 0–40)
B BURGDOR IGG+IGM SER QL IA: NEGATIVE
BASOPHILS # BLD AUTO: 0.1 X10E3/UL (ref 0–0.2)
BASOPHILS NFR BLD AUTO: 1 %
BILIRUB SERPL-MCNC: 0.3 MG/DL (ref 0–1.2)
BUN SERPL-MCNC: 19 MG/DL (ref 6–20)
BUN/CREAT SERPL: 21 (ref 9–20)
CALCIUM SERPL-MCNC: 9.9 MG/DL (ref 8.7–10.2)
CHLORIDE SERPL-SCNC: 104 MMOL/L (ref 96–106)
CO2 SERPL-SCNC: 21 MMOL/L (ref 20–29)
CREAT SERPL-MCNC: 0.92 MG/DL (ref 0.76–1.27)
EGFRCR SERPLBLD CKD-EPI 2021: 121 ML/MIN/1.73
EOSINOPHIL # BLD AUTO: 0.1 X10E3/UL (ref 0–0.4)
EOSINOPHIL NFR BLD AUTO: 1 %
ERYTHROCYTE [DISTWIDTH] IN BLOOD BY AUTOMATED COUNT: 12.2 % (ref 11.6–15.4)
GLOBULIN SER CALC-MCNC: 2.3 G/DL (ref 1.5–4.5)
GLUCOSE SERPL-MCNC: 91 MG/DL (ref 70–99)
HCT VFR BLD AUTO: 46.9 % (ref 37.5–51)
HGB BLD-MCNC: 15.7 G/DL (ref 13–17.7)
IMM GRANULOCYTES # BLD AUTO: 0 X10E3/UL (ref 0–0.1)
IMM GRANULOCYTES NFR BLD AUTO: 0 %
LYMPHOCYTES # BLD AUTO: 2.1 X10E3/UL (ref 0.7–3.1)
LYMPHOCYTES NFR BLD AUTO: 30 %
MCH RBC QN AUTO: 29.7 PG (ref 26.6–33)
MCHC RBC AUTO-ENTMCNC: 33.5 G/DL (ref 31.5–35.7)
MCV RBC AUTO: 89 FL (ref 79–97)
MONOCYTES # BLD AUTO: 0.3 X10E3/UL (ref 0.1–0.9)
MONOCYTES NFR BLD AUTO: 4 %
NEUTROPHILS # BLD AUTO: 4.4 X10E3/UL (ref 1.4–7)
NEUTROPHILS NFR BLD AUTO: 64 %
PLATELET # BLD AUTO: 261 X10E3/UL (ref 150–450)
POTASSIUM SERPL-SCNC: 5.1 MMOL/L (ref 3.5–5.2)
PROT SERPL-MCNC: 7.3 G/DL (ref 6–8.5)
RBC # BLD AUTO: 5.29 X10E6/UL (ref 4.14–5.8)
SODIUM SERPL-SCNC: 140 MMOL/L (ref 134–144)
T4 FREE SERPL-MCNC: 1.29 NG/DL (ref 0.82–1.77)
TSH SERPL DL<=0.005 MIU/L-ACNC: 1.85 UIU/ML (ref 0.45–4.5)
WBC # BLD AUTO: 6.8 X10E3/UL (ref 3.4–10.8)

## 2023-11-28 RX ORDER — CITALOPRAM 10 MG/1
10 TABLET ORAL DAILY
Qty: 60 TABLET | Refills: 0 | Status: SHIPPED | OUTPATIENT
Start: 2023-11-28 | End: 2024-01-15

## 2024-03-15 RX ORDER — CITALOPRAM 20 MG/1
20 TABLET, FILM COATED ORAL DAILY
Qty: 30 TABLET | Refills: 1 | Status: SHIPPED | OUTPATIENT
Start: 2024-03-15 | End: 2024-04-10 | Stop reason: SDUPTHER

## 2024-04-10 ENCOUNTER — OFFICE VISIT (OUTPATIENT)
Dept: FAMILY MEDICINE | Facility: CLINIC | Age: 24
End: 2024-04-10
Payer: COMMERCIAL

## 2024-04-10 VITALS
BODY MASS INDEX: 25.76 KG/M2 | WEIGHT: 184 LBS | HEIGHT: 71 IN | TEMPERATURE: 97.5 F | SYSTOLIC BLOOD PRESSURE: 98 MMHG | RESPIRATION RATE: 18 BRPM | OXYGEN SATURATION: 98 % | HEART RATE: 97 BPM | DIASTOLIC BLOOD PRESSURE: 62 MMHG

## 2024-04-10 DIAGNOSIS — G47.00 INSOMNIA, UNSPECIFIED TYPE: ICD-10-CM

## 2024-04-10 DIAGNOSIS — F33.1 MODERATE EPISODE OF RECURRENT MAJOR DEPRESSIVE DISORDER (CMS/HCC): Primary | ICD-10-CM

## 2024-04-10 PROCEDURE — 99214 OFFICE O/P EST MOD 30 MIN: CPT | Performed by: NURSE PRACTITIONER

## 2024-04-10 PROCEDURE — 3008F BODY MASS INDEX DOCD: CPT | Performed by: NURSE PRACTITIONER

## 2024-04-10 RX ORDER — TRAZODONE HYDROCHLORIDE 50 MG/1
50 TABLET ORAL NIGHTLY PRN
Qty: 30 TABLET | Refills: 0 | Status: SHIPPED | OUTPATIENT
Start: 2024-04-10

## 2024-04-10 RX ORDER — CITALOPRAM 40 MG/1
40 TABLET, FILM COATED ORAL DAILY
Qty: 30 TABLET | Refills: 1 | Status: SHIPPED | OUTPATIENT
Start: 2024-04-10 | End: 2024-06-14

## 2024-04-10 ASSESSMENT — ENCOUNTER SYMPTOMS
DIZZINESS: 0
SHORTNESS OF BREATH: 0
SPEECH DIFFICULTY: 0
FEVER: 0
HEADACHES: 0
SLEEP DISTURBANCE: 1
LIGHT-HEADEDNESS: 0
FATIGUE: 1
NERVOUS/ANXIOUS: 1
WEAKNESS: 0
DECREASED CONCENTRATION: 0
CHILLS: 0
DYSPHORIC MOOD: 1
AGITATION: 0

## 2024-04-10 NOTE — ASSESSMENT & PLAN NOTE
He would like to try something Rx at this time.  Trazodone 50mg sent, s/e reviewed.  Also encouraged regular exercise and eliminating screen time 2hr prior to bedtime.  F/U 4-6 weeks.

## 2024-04-10 NOTE — PROGRESS NOTES
[facility]  [address] [place] [address]  [phone]          Glenroy Antonio is a 23 y.o. male who presents with DEPRESSION / INSOMNIA  Chief Complaint   Patient presents with    Insomnia     Started - 3 months ago.   OTC - tried melatonin and sleep spray and allergy meds nothing helped    Medication Reaction     Celexa - pt states not working anymore         HPI    # mood  Currently on Celexa 20mg and vitamin D otc  He restarted this in Nov after taking a break x 1 year  Feels it's helping anxiety but not depression  Fatigue workup in the fall was negative (thyroid, lyme, cbc, cmp)  Previously tried wellbutrin and zoloft, they never seemed to make a difference  He had 5 therapy appts within the past 3 months, wasn't thrilled with the providers      # insomnia  3-4 months  Takes 5+ hours to fall asleep  Tried melatonin, benadryl, magnesium without relief  He does scroll on his phone a lot at night, watch movies, etc      Medical History:  Past Medical History:   Diagnosis Date    Depression        Surgical History:  History reviewed. No pertinent surgical history.    Social History:  Social History     Social History Narrative    Not on file       Family History:  Family History   Problem Relation Age of Onset    Anxiety disorder Biological Mother        Allergies:  Patient has no known allergies.    Current Medications:  Current Outpatient Medications   Medication Sig Dispense Refill    cholecalciferol, vitamin D3, (VITAMIN D3) 10 mcg (400 unit) capsule Take by mouth.      citalopram (celeXA) 40 mg tablet Take 1 tablet (40 mg total) by mouth daily. 30 tablet 1    traZODone (DESYREL) 50 mg tablet Take 1 tablet (50 mg total) by mouth nightly as needed for sleep. 30 tablet 0     No current facility-administered medications for this visit.       Review of Systems:  Review of Systems   Constitutional:  Positive for fatigue. Negative for chills and fever.   Respiratory:  Negative for  "shortness of breath.    Cardiovascular:  Negative for chest pain.   Neurological:  Negative for dizziness, syncope, speech difficulty, weakness, light-headedness and headaches.   Psychiatric/Behavioral:  Positive for dysphoric mood and sleep disturbance. Negative for agitation, behavioral problems, decreased concentration, self-injury and suicidal ideas. The patient is nervous/anxious.        Objective     Vital Signs:  Vitals:    04/10/24 1604   BP: 98/62   BP Location: Right upper arm   Patient Position: Sitting   Pulse: 97   Resp: 18   Temp: 36.4 °C (97.5 °F)   TempSrc: Temporal   SpO2: 98%   Weight: 83.5 kg (184 lb)   Height: 1.803 m (5' 11\")       BMI:  Body mass index is 25.66 kg/m².     Physical Exam  Vitals reviewed.   Constitutional:       Appearance: Normal appearance.   HENT:      Head: Normocephalic and atraumatic.   Eyes:      Extraocular Movements: Extraocular movements intact.   Cardiovascular:      Rate and Rhythm: Normal rate.   Pulmonary:      Effort: Pulmonary effort is normal.   Neurological:      General: No focal deficit present.      Mental Status: He is alert and oriented to person, place, and time.   Psychiatric:         Mood and Affect: Mood normal.         Behavior: Behavior normal.         Thought Content: Thought content normal.         Judgment: Judgment normal.          Procedures   Assessment     There are no Patient Instructions on file for this visit.  Problem List Items Addressed This Visit       Moderate episode of recurrent major depressive disorder (CMS/HCC) - Primary     Increase Celexa to 40mg before deciding to switch to another drug.  He is agreeable with this plan.  F/U 4-6 weeks.  Encouraged to try a different therapist.  Call if any concerns.         Relevant Medications    citalopram (celeXA) 40 mg tablet    traZODone (DESYREL) 50 mg tablet    Insomnia     He would like to try something Rx at this time.  Trazodone 50mg sent, s/e reviewed.  Also encouraged regular " exercise and eliminating screen time 2hr prior to bedtime.  F/U 4-6 weeks.         Relevant Medications    traZODone (DESYREL) 50 mg tablet          The total time spent ON THE DAY OF THE VISIT was 30 minutes, including preparing to see the patient, obtaining and reviewing separately obtained history, performing medically appropriate examination or evaluation, counseling and educating patient/family/caregiver, ordering medications, tests or procedures, referring and communicating with other health care professionals, documenting clinical information in electronic or other record, independently interpreting and communicating results to patient/family/caregiver, and/or care coordination.           AYALA Plasencia  4/10/2024

## 2024-04-10 NOTE — ASSESSMENT & PLAN NOTE
Increase Celexa to 40mg before deciding to switch to another drug.  He is agreeable with this plan.  F/U 4-6 weeks.  Encouraged to try a different therapist.  Call if any concerns.

## 2024-06-14 DIAGNOSIS — F33.1 MODERATE EPISODE OF RECURRENT MAJOR DEPRESSIVE DISORDER (CMS/HCC): ICD-10-CM

## 2024-06-14 RX ORDER — CITALOPRAM 40 MG/1
40 TABLET, FILM COATED ORAL DAILY
Qty: 30 TABLET | Refills: 0 | Status: SHIPPED | OUTPATIENT
Start: 2024-06-14 | End: 2024-07-22

## 2024-07-21 DIAGNOSIS — F33.1 MODERATE EPISODE OF RECURRENT MAJOR DEPRESSIVE DISORDER (CMS/HCC): ICD-10-CM

## 2024-07-22 RX ORDER — CITALOPRAM 40 MG/1
40 TABLET, FILM COATED ORAL DAILY
Qty: 30 TABLET | Refills: 0 | Status: SHIPPED | OUTPATIENT
Start: 2024-07-22